# Patient Record
Sex: FEMALE | Race: WHITE | Employment: PART TIME | URBAN - NONMETROPOLITAN AREA
[De-identification: names, ages, dates, MRNs, and addresses within clinical notes are randomized per-mention and may not be internally consistent; named-entity substitution may affect disease eponyms.]

---
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
## 2021-03-03 ENCOUNTER — HOSPITAL ENCOUNTER (OUTPATIENT)
Age: 21
Discharge: HOME | End: 2021-03-03

## 2021-09-30 ENCOUNTER — OFFICE VISIT (OUTPATIENT)
Dept: FAMILY MEDICINE CLINIC | Age: 21
End: 2021-09-30
Payer: COMMERCIAL

## 2021-09-30 VITALS
WEIGHT: 130.8 LBS | OXYGEN SATURATION: 98 % | SYSTOLIC BLOOD PRESSURE: 118 MMHG | DIASTOLIC BLOOD PRESSURE: 70 MMHG | HEIGHT: 64 IN | RESPIRATION RATE: 12 BRPM | HEART RATE: 92 BPM | BODY MASS INDEX: 22.33 KG/M2 | TEMPERATURE: 97 F

## 2021-09-30 DIAGNOSIS — Z23 NEED FOR VARICELLA VACCINE: ICD-10-CM

## 2021-09-30 DIAGNOSIS — F41.9 ANXIETY: ICD-10-CM

## 2021-09-30 DIAGNOSIS — Z11.59 ENCOUNTER FOR HEPATITIS C SCREENING TEST FOR LOW RISK PATIENT: ICD-10-CM

## 2021-09-30 DIAGNOSIS — F32.A DEPRESSION, UNSPECIFIED DEPRESSION TYPE: ICD-10-CM

## 2021-09-30 DIAGNOSIS — R07.9 CHEST PAIN, UNSPECIFIED TYPE: Primary | ICD-10-CM

## 2021-09-30 DIAGNOSIS — Z11.4 ENCOUNTER FOR HIV (HUMAN IMMUNODEFICIENCY VIRUS) TEST: ICD-10-CM

## 2021-09-30 PROCEDURE — 93000 ELECTROCARDIOGRAM COMPLETE: CPT | Performed by: STUDENT IN AN ORGANIZED HEALTH CARE EDUCATION/TRAINING PROGRAM

## 2021-09-30 PROCEDURE — 99204 OFFICE O/P NEW MOD 45 MIN: CPT | Performed by: STUDENT IN AN ORGANIZED HEALTH CARE EDUCATION/TRAINING PROGRAM

## 2021-09-30 SDOH — ECONOMIC STABILITY: FOOD INSECURITY: WITHIN THE PAST 12 MONTHS, YOU WORRIED THAT YOUR FOOD WOULD RUN OUT BEFORE YOU GOT MONEY TO BUY MORE.: NEVER TRUE

## 2021-09-30 SDOH — ECONOMIC STABILITY: FOOD INSECURITY: WITHIN THE PAST 12 MONTHS, THE FOOD YOU BOUGHT JUST DIDN'T LAST AND YOU DIDN'T HAVE MONEY TO GET MORE.: NEVER TRUE

## 2021-09-30 ASSESSMENT — PATIENT HEALTH QUESTIONNAIRE - PHQ9
SUM OF ALL RESPONSES TO PHQ9 QUESTIONS 1 & 2: 1
SUM OF ALL RESPONSES TO PHQ QUESTIONS 1-9: 1
SUM OF ALL RESPONSES TO PHQ QUESTIONS 1-9: 1
1. LITTLE INTEREST OR PLEASURE IN DOING THINGS: 0
SUM OF ALL RESPONSES TO PHQ QUESTIONS 1-9: 1
2. FEELING DOWN, DEPRESSED OR HOPELESS: 1

## 2021-09-30 ASSESSMENT — SOCIAL DETERMINANTS OF HEALTH (SDOH): HOW HARD IS IT FOR YOU TO PAY FOR THE VERY BASICS LIKE FOOD, HOUSING, MEDICAL CARE, AND HEATING?: NOT HARD AT ALL

## 2021-09-30 ASSESSMENT — ENCOUNTER SYMPTOMS
DIARRHEA: 0
SHORTNESS OF BREATH: 1
RHINORRHEA: 0
CONSTIPATION: 0
WHEEZING: 0
ABDOMINAL PAIN: 0

## 2021-09-30 NOTE — PATIENT INSTRUCTIONS
Thank you   1. Thank you for trusting us with your healthcare needs. You may receive a survey regarding today's visit. It would help us out if you would take a few moments to provide your feedback. We value your input. 2. Please bring in ALL medications BOTTLES, including inhalers, herbal supplements, over the counter, prescribed & non-prescribed medicine. The office would like actual medication bottles and a list.   3. Please note our OFFICE POLICIES:   a. Prior to getting your labs drawn, please check with your insurance company for benefits and eligibility of lab services. Often, insurance companies cover certain tests for preventative visits only. It is patient's responsibility to see what is covered. b. We are unable to change a diagnosis after the test has been performed. c. Lab orders will not be re-printed. Please hold onto your original lab orders and take them to your lab to be completed. d. If you no show your scheduled appointment three times, you will be dismissed from this practice. e. Helen Button must be completed 24 hours prior to your schedule appointment. 4. If the list below has been completed, PLEASE FAX RECORDS TO OUR OFFICE @ [phone].  Once the records have been received we will update your records at our office:  Health Maintenance Due   Topic Date Due    Hepatitis C screen  Never done    Varicella vaccine (1 of 2 - 2-dose childhood series) Never done    HPV vaccine (1 - 2-dose series) Never done    HIV screen  Never done    Chlamydia screen  Never done    DTaP/Tdap/Td vaccine (1 - Tdap) Never done    Flu vaccine (1) Never done

## 2021-09-30 NOTE — PROGRESS NOTES
S: 22 y.o. female with   Chief Complaint   Patient presents with    Established New Doctor     chest pains last time was 3 weeks ago lasted 1-3 hours       HPI: please see resident note for HPI and ROS. BP Readings from Last 3 Encounters:   09/30/21 118/70   12/18/19 (!) 109/56   10/09/19 (!) 106/56     Wt Readings from Last 3 Encounters:   09/30/21 130 lb 12.8 oz (59.3 kg)   12/18/19 128 lb (58.1 kg) (55 %, Z= 0.13)*   10/09/19 127 lb (57.6 kg) (54 %, Z= 0.10)*     * Growth percentiles are based on CDC (Girls, 2-20 Years) data. O: VS:  height is 5' 4\" (1.626 m) and weight is 130 lb 12.8 oz (59.3 kg). Her temporal temperature is 97 °F (36.1 °C). Her blood pressure is 118/70 and her pulse is 92. Her respiration is 12 and oxygen saturation is 98%. AAO/NAD, appropriate affect for mood       Diagnosis Orders   1. Chest pain, unspecified type  EKG 12 Lead   2. Anxiety     3. Depression, unspecified depression type     4. Encounter for HIV (human immunodeficiency virus) test     5. Encounter for hepatitis C screening test for low risk patient         Plan:  EKG and labs as ordered. Start zoloft 50mg daily. Health Maintenance Due   Topic Date Due    Hepatitis C screen  Never done    Varicella vaccine (1 of 2 - 2-dose childhood series) Never done    HPV vaccine (1 - 2-dose series) Never done    HIV screen  Never done    Chlamydia screen  Never done    DTaP/Tdap/Td vaccine (1 - Tdap) Never done    Flu vaccine (1) Never done       Attending Physician Statement  I have discussed the case, including pertinent history and exam findings with the resident. I also have seen the patient and performed key portions of the examination. I agree with the documented assessment and plan as documented by the resident.         Vini Deleon DO 9/30/2021 10:31 AM

## 2021-09-30 NOTE — PROGRESS NOTES
Luis Hebert is a 22 y.o. female who presents today for:  Chief Complaint   Patient presents with   Katerine Medina Doctor     chest pains last time was 3 weeks ago lasted 1-3 hours       HPI:   Was having daily bouts of chest pain, started April/may 2021  Got worse over summer  Pressure in the L chest and into her neck, sometimes in her back   Peak pain 10/10  SOB with it  Lasts about an hour, up to 3   Nothing seems to bring the pain on  Endorses hx of anxiety with stress and panic attacks that feel different than this   Last episode 3 weeks ago   fam hx- uncle with heart attack at 50   No pain with working out- weight lifting, tennis, running.  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     No medical problems, only on birth control from [place]    Non smoker  No alcohol  No illicit drug use        Food Insecurity: No Food Insecurity    Worried About Running Out of Food in the Last Year: Never true    Ran Out of Food in the Last Year: Never true     Health Maintenance reviewed -   Health Maintenance   Topic Date Due    Hepatitis C screen  Never done    Varicella vaccine (1 of 2 - 2-dose childhood series) Never done    HPV vaccine (1 - 2-dose series) Never done    HIV screen  Never done    Chlamydia screen  Never done    DTaP/Tdap/Td vaccine (1 - Tdap) Never done    Flu vaccine (1) Never done    COVID-19 Vaccine  Completed    Hepatitis A vaccine  Aged Out    Hepatitis B vaccine  Aged Out    Hib vaccine  Aged Out    Meningococcal (ACWY) vaccine  Aged Out    Pneumococcal 0-64 years Vaccine  Aged Out     Current Outpatient Medications   Medication Sig Dispense Refill    sertraline (ZOLOFT) 50 MG tablet Take 1 tablet by mouth daily 45 tablet 0    levonorgestrel-ethinyl estradiol (KURVELO) 0.15-30 MG-MCG per tablet Take 1 tablet by mouth daily       No current facility-administered medications for this visit. Social History     Tobacco Use    Smoking status: Never Smoker    Smokeless tobacco: Never Used   Substance Use Topics    Alcohol use: No      Subjective:   Review of Systems   Constitutional: Negative for fatigue and fever. HENT: Negative for congestion and rhinorrhea. Respiratory: Positive for shortness of breath. Negative for wheezing. Cardiovascular: Positive for chest pain and palpitations. Gastrointestinal: Negative for abdominal pain, constipation and diarrhea. Genitourinary: Negative for dysuria, vaginal discharge and vaginal pain. Objective:     Vitals:    09/30/21 0955   BP: 118/70   Site: Left Upper Arm   Position: Sitting   Cuff Size: Medium Adult   Pulse: 92   Resp: 12   Temp: 97 °F (36.1 °C)   TempSrc: Temporal   SpO2: 98%   Weight: 130 lb 12.8 oz (59.3 kg)   Height: 5' 4\" (1.626 m)     Body mass index is 22.45 kg/m². Wt Readings from Last 3 Encounters:   09/30/21 130 lb 12.8 oz (59.3 kg)   12/18/19 128 lb (58.1 kg) (55 %, Z= 0.13)*   10/09/19 127 lb (57.6 kg) (54 %, Z= 0.10)*     * Growth percentiles are based on CDC (Girls, 2-20 Years) data. BP Readings from Last 3 Encounters:   09/30/21 118/70   12/18/19 (!) 109/56   10/09/19 (!) 106/56     Physical Exam  Vitals and nursing note reviewed. Constitutional:       General: She is not in acute distress. Appearance: She is well-developed. She is not diaphoretic. HENT:      Head: Normocephalic and atraumatic. Right Ear: Tympanic membrane normal.      Left Ear: Tympanic membrane normal.      Mouth/Throat:      Mouth: Mucous membranes are moist.      Pharynx: No oropharyngeal exudate or posterior oropharyngeal erythema. Eyes:      General: No scleral icterus.      Extraocular Movements: Extraocular movements intact. Pupils: Pupils are equal, round, and reactive to light. Cardiovascular:      Rate and Rhythm: Normal rate and regular rhythm. Heart sounds: Normal heart sounds. No murmur heard. Pulmonary:      Effort: Pulmonary effort is normal.      Breath sounds: Normal breath sounds. No wheezing. Abdominal:      General: There is no distension. Palpations: Abdomen is soft. Tenderness: There is no abdominal tenderness. Neurological:      General: No focal deficit present. Mental Status: She is alert. Cranial Nerves: No cranial nerve deficit. Psychiatric:         Thought Content: Thought content normal.         Judgment: Judgment normal.       EKG performed in office today was within normal limits    Assessment / Plan:   Nacho Salas was seen today for established new doctor. Diagnoses and all orders for this visit:    Chest pain, unspecified type  -     EKG 12 Lead  -     Comprehensive Metabolic Panel; Future  -     CBC With Auto Differential; Future  -     TSH; Future  -     T4, Free; Future    Anxiety  -     sertraline (ZOLOFT) 50 MG tablet; Take 1 tablet by mouth daily    Depression, unspecified depression type  -     sertraline (ZOLOFT) 50 MG tablet; Take 1 tablet by mouth daily  -     Comprehensive Metabolic Panel; Future  -     TSH; Future  -     T4, Free; Future    Encounter for HIV (human immunodeficiency virus) test  -     HIV Screen; Future    Encounter for hepatitis C screening test for low risk patient  -     Hepatitis C Antibody; Future    Need for varicella vaccine  -     Varicella Zoster Antibody, IgG; Future      Patient presents to establish care and discuss chest pains   Intermittent chest pains-unclear etiology at this time, likely related to anxiety. Consider esophagus/blood vessel spasms. EKG in office today was normal.  Will check labs. We will start Zoloft for anxiety, see below   Anxiety/depression-chronic, poorly controlled.   Patient did not tolerate Lexapro in the past.  We will start Zoloft 50 mg daily today   We will check a CMP, CBC, TSH, free T4 along with health maintenance labs of HIV screen, hep C screen, check for zoster immunity. Patient to follow-up in 1 month, earlier as needed       Return in about 4 weeks (around 10/28/2021). Medications Prescribed:  Orders Placed This Encounter   Medications    sertraline (ZOLOFT) 50 MG tablet     Sig: Take 1 tablet by mouth daily     Dispense:  45 tablet     Refill:  0       Future Appointments   Date Time Provider Tessy Mosquera   11/2/2021  3:40 PM [facility]       Patient given educational materials - see patient instructions. Discussed use, benefit, and side effects of prescribed medications. All patient questions answered. Pt voiced understanding. Instructed to continue current medications, diet and exercise. Patient agreed with treatment plan. Follow up as directed. Part of this visit was documented via [id number] technology, please excuse any errors.      Electronically signed by Ann Perea MD on 9/30/2021 at 10:42 AM

## 2021-09-30 NOTE — PROGRESS NOTES
Health Maintenance Due   Topic Date Due    Hepatitis C screen  Never done    Varicella vaccine (1 of 2 - 2-dose childhood series) Never done    HPV vaccine (1 - 2-dose series) Never done    HIV screen  Never done    Chlamydia screen  Never done    DTaP/Tdap/Td vaccine (1 - Tdap) Never done    Flu vaccine (1) Never done

## 2021-11-22 ENCOUNTER — ANESTHESIA EVENT (OUTPATIENT)
Dept: OPERATING ROOM | Age: 21
End: 2021-11-22
Payer: COMMERCIAL

## 2021-11-22 ENCOUNTER — HOSPITAL ENCOUNTER (OUTPATIENT)
Age: 21
Setting detail: OBSERVATION
Discharge: HOME | End: 2021-11-23
Attending: EMERGENCY MEDICINE | Admitting: SURGERY
Payer: COMMERCIAL

## 2021-11-22 ENCOUNTER — APPOINTMENT (OUTPATIENT)
Dept: CT IMAGING | Age: 21
End: 2021-11-22
Payer: COMMERCIAL

## 2021-11-22 ENCOUNTER — ANESTHESIA (OUTPATIENT)
Dept: OPERATING ROOM | Age: 21
End: 2021-11-22
Payer: COMMERCIAL

## 2021-11-22 VITALS — SYSTOLIC BLOOD PRESSURE: 143 MMHG | DIASTOLIC BLOOD PRESSURE: 68 MMHG | OXYGEN SATURATION: 94 %

## 2021-11-22 DIAGNOSIS — K35.80 ACUTE APPENDICITIS, UNSPECIFIED ACUTE APPENDICITIS TYPE: ICD-10-CM

## 2021-11-22 DIAGNOSIS — K35.20 ACUTE APPENDICITIS WITH GENERALIZED PERITONITIS, WITHOUT ABSCESS, UNSPECIFIED WHETHER GANGRENE PRESENT, UNSPECIFIED WHETHER PERFORATION PRESENT: Primary | ICD-10-CM

## 2021-11-22 LAB
ALBUMIN SERPL-MCNC: 4.6 G/DL (ref 3.5–5.1)
ALP BLD-CCNC: 82 U/L (ref 38–126)
ALT SERPL-CCNC: 10 U/L (ref 11–66)
AMPHETAMINE+METHAMPHETAMINE URINE SCREEN: NEGATIVE
ANION GAP SERPL CALCULATED.3IONS-SCNC: 14 MEQ/L (ref 8–16)
AST SERPL-CCNC: 20 U/L (ref 5–40)
BACTERIA: ABNORMAL /HPF
BARBITURATE QUANTITATIVE URINE: NEGATIVE
BASOPHILS # BLD: 0.6 %
BASOPHILS ABSOLUTE: 0.1 THOU/MM3 (ref 0–0.1)
BENZODIAZEPINE QUANTITATIVE URINE: NEGATIVE
BILIRUB SERPL-MCNC: 0.3 MG/DL (ref 0.3–1.2)
BILIRUBIN DIRECT: < 0.2 MG/DL (ref 0–0.3)
BILIRUBIN URINE: NEGATIVE
BLOOD, URINE: NEGATIVE
BUN BLDV-MCNC: 8 MG/DL (ref 7–22)
CALCIUM SERPL-MCNC: 9.4 MG/DL (ref 8.5–10.5)
CANNABINOID QUANTITATIVE URINE: NEGATIVE
CASTS 2: ABNORMAL /LPF
CASTS UA: ABNORMAL /LPF
CHARACTER, URINE: CLEAR
CHLORIDE BLD-SCNC: 103 MEQ/L (ref 98–111)
CO2: 23 MEQ/L (ref 23–33)
COCAINE METABOLITE QUANTITATIVE URINE: NEGATIVE
COLOR: YELLOW
CREAT SERPL-MCNC: 0.8 MG/DL (ref 0.4–1.2)
CRYSTALS, UA: ABNORMAL
EOSINOPHIL # BLD: 2.4 %
EOSINOPHILS ABSOLUTE: 0.2 THOU/MM3 (ref 0–0.4)
EPITHELIAL CELLS, UA: ABNORMAL /HPF
ERYTHROCYTE [DISTWIDTH] IN BLOOD BY AUTOMATED COUNT: 12.2 % (ref 11.5–14.5)
ERYTHROCYTE [DISTWIDTH] IN BLOOD BY AUTOMATED COUNT: 40.8 FL (ref 35–45)
GFR SERPL CREATININE-BSD FRML MDRD: > 90 ML/MIN/1.73M2
GLUCOSE BLD-MCNC: 91 MG/DL (ref 70–108)
GLUCOSE URINE: NEGATIVE MG/DL
HCT VFR BLD CALC: 46.1 % (ref 37–47)
HEMOGLOBIN: 15 GM/DL (ref 12–16)
IMMATURE GRANS (ABS): 0.04 THOU/MM3 (ref 0–0.07)
IMMATURE GRANULOCYTES: 0.4 %
KETONES, URINE: NEGATIVE
LEUKOCYTE ESTERASE, URINE: ABNORMAL
LIPASE: 21.6 U/L (ref 5.6–51.3)
LYMPHOCYTES # BLD: 28.5 %
LYMPHOCYTES ABSOLUTE: 2.7 THOU/MM3 (ref 1–4.8)
MAGNESIUM: 1.8 MG/DL (ref 1.6–2.4)
MCH RBC QN AUTO: 29.2 PG (ref 26–33)
MCHC RBC AUTO-ENTMCNC: 32.5 GM/DL (ref 32.2–35.5)
MCV RBC AUTO: 89.7 FL (ref 81–99)
MISCELLANEOUS 2: ABNORMAL
MONOCYTES # BLD: 5.8 %
MONOCYTES ABSOLUTE: 0.6 THOU/MM3 (ref 0.4–1.3)
MUCUS: ABNORMAL
NITRITE, URINE: NEGATIVE
NUCLEATED RED BLOOD CELLS: 0 /100 WBC
OPIATES, URINE: NEGATIVE
OSMOLALITY CALCULATION: 277.3 MOSMOL/KG (ref 275–300)
OXYCODONE: NEGATIVE
PH UA: 5 (ref 5–9)
PHENCYCLIDINE QUANTITATIVE URINE: NEGATIVE
PLATELET # BLD: 381 THOU/MM3 (ref 130–400)
PMV BLD AUTO: 9 FL (ref 9.4–12.4)
POTASSIUM SERPL-SCNC: 3.7 MEQ/L (ref 3.5–5.2)
PREGNANCY, SERUM: NEGATIVE
PROTEIN UA: NEGATIVE
RBC # BLD: 5.14 MILL/MM3 (ref 4.2–5.4)
RBC URINE: ABNORMAL /HPF
RENAL EPITHELIAL, UA: ABNORMAL
SEG NEUTROPHILS: 62.3 %
SEGMENTED NEUTROPHILS ABSOLUTE COUNT: 6 THOU/MM3 (ref 1.8–7.7)
SODIUM BLD-SCNC: 140 MEQ/L (ref 135–145)
SPECIFIC GRAVITY, URINE: 1.02 (ref 1–1.03)
TOTAL PROTEIN: 8.3 G/DL (ref 6.1–8)
UROBILINOGEN, URINE: 0.2 EU/DL (ref 0–1)
WBC # BLD: 9.6 THOU/MM3 (ref 4.8–10.8)
WBC UA: ABNORMAL /HPF
YEAST: ABNORMAL

## 2021-11-22 PROCEDURE — 44970 LAPAROSCOPY APPENDECTOMY: CPT | Performed by: SURGERY

## 2021-11-22 PROCEDURE — 6360000002 HC RX W HCPCS: Performed by: NURSE ANESTHETIST, CERTIFIED REGISTERED

## 2021-11-22 PROCEDURE — 2580000003 HC RX 258: Performed by: NURSE ANESTHETIST, CERTIFIED REGISTERED

## 2021-11-22 PROCEDURE — 2580000003 HC RX 258: Performed by: SURGERY

## 2021-11-22 PROCEDURE — 36415 COLL VENOUS BLD VENIPUNCTURE: CPT

## 2021-11-22 PROCEDURE — 96376 TX/PRO/DX INJ SAME DRUG ADON: CPT

## 2021-11-22 PROCEDURE — 6360000002 HC RX W HCPCS: Performed by: SURGERY

## 2021-11-22 PROCEDURE — 80053 COMPREHEN METABOLIC PANEL: CPT

## 2021-11-22 PROCEDURE — 88304 TISSUE EXAM BY PATHOLOGIST: CPT

## 2021-11-22 PROCEDURE — 83735 ASSAY OF MAGNESIUM: CPT

## 2021-11-22 PROCEDURE — C9113 INJ PANTOPRAZOLE SODIUM, VIA: HCPCS | Performed by: EMERGENCY MEDICINE

## 2021-11-22 PROCEDURE — 3600000004 HC SURGERY LEVEL 4 BASE: Performed by: SURGERY

## 2021-11-22 PROCEDURE — G0378 HOSPITAL OBSERVATION PER HR: HCPCS

## 2021-11-22 PROCEDURE — 6370000000 HC RX 637 (ALT 250 FOR IP): Performed by: SURGERY

## 2021-11-22 PROCEDURE — 7100000001 HC PACU RECOVERY - ADDTL 15 MIN: Performed by: SURGERY

## 2021-11-22 PROCEDURE — 3600000014 HC SURGERY LEVEL 4 ADDTL 15MIN: Performed by: SURGERY

## 2021-11-22 PROCEDURE — 6360000002 HC RX W HCPCS: Performed by: EMERGENCY MEDICINE

## 2021-11-22 PROCEDURE — 3700000001 HC ADD 15 MINUTES (ANESTHESIA): Performed by: SURGERY

## 2021-11-22 PROCEDURE — 96374 THER/PROPH/DIAG INJ IV PUSH: CPT

## 2021-11-22 PROCEDURE — 3700000000 HC ANESTHESIA ATTENDED CARE: Performed by: SURGERY

## 2021-11-22 PROCEDURE — 81001 URINALYSIS AUTO W/SCOPE: CPT

## 2021-11-22 PROCEDURE — 74177 CT ABD & PELVIS W/CONTRAST: CPT

## 2021-11-22 PROCEDURE — 2709999900 HC NON-CHARGEABLE SUPPLY: Performed by: SURGERY

## 2021-11-22 PROCEDURE — 84703 CHORIONIC GONADOTROPIN ASSAY: CPT

## 2021-11-22 PROCEDURE — 2720000010 HC SURG SUPPLY STERILE: Performed by: SURGERY

## 2021-11-22 PROCEDURE — 7100000000 HC PACU RECOVERY - FIRST 15 MIN: Performed by: SURGERY

## 2021-11-22 PROCEDURE — 99283 EMERGENCY DEPT VISIT LOW MDM: CPT

## 2021-11-22 PROCEDURE — 2500000003 HC RX 250 WO HCPCS: Performed by: NURSE ANESTHETIST, CERTIFIED REGISTERED

## 2021-11-22 PROCEDURE — 2500000003 HC RX 250 WO HCPCS: Performed by: SURGERY

## 2021-11-22 PROCEDURE — 82248 BILIRUBIN DIRECT: CPT

## 2021-11-22 PROCEDURE — 99220 PR INITIAL OBSERVATION CARE/DAY 70 MINUTES: CPT | Performed by: SURGERY

## 2021-11-22 PROCEDURE — 83690 ASSAY OF LIPASE: CPT

## 2021-11-22 PROCEDURE — 85025 COMPLETE CBC W/AUTO DIFF WBC: CPT

## 2021-11-22 PROCEDURE — 6360000004 HC RX CONTRAST MEDICATION: Performed by: EMERGENCY MEDICINE

## 2021-11-22 PROCEDURE — 80307 DRUG TEST PRSMV CHEM ANLYZR: CPT

## 2021-11-22 PROCEDURE — 96375 TX/PRO/DX INJ NEW DRUG ADDON: CPT

## 2021-11-22 PROCEDURE — 2580000003 HC RX 258: Performed by: EMERGENCY MEDICINE

## 2021-11-22 RX ORDER — SODIUM CHLORIDE 0.9 % (FLUSH) 0.9 %
5-40 SYRINGE (ML) INJECTION PRN
Status: DISCONTINUED | OUTPATIENT
Start: 2021-11-22 | End: 2021-11-22 | Stop reason: HOSPADM

## 2021-11-22 RX ORDER — ROCURONIUM BROMIDE 10 MG/ML
INJECTION, SOLUTION INTRAVENOUS PRN
Status: DISCONTINUED | OUTPATIENT
Start: 2021-11-22 | End: 2021-11-22 | Stop reason: SDUPTHER

## 2021-11-22 RX ORDER — LABETALOL 20 MG/4 ML (5 MG/ML) INTRAVENOUS SYRINGE
5 EVERY 10 MIN PRN
Status: DISCONTINUED | OUTPATIENT
Start: 2021-11-22 | End: 2021-11-22 | Stop reason: HOSPADM

## 2021-11-22 RX ORDER — PANTOPRAZOLE SODIUM 40 MG/10ML
40 INJECTION, POWDER, LYOPHILIZED, FOR SOLUTION INTRAVENOUS ONCE
Status: COMPLETED | OUTPATIENT
Start: 2021-11-22 | End: 2021-11-22

## 2021-11-22 RX ORDER — SODIUM CHLORIDE 9 MG/ML
INJECTION, SOLUTION INTRAVENOUS CONTINUOUS
Status: DISCONTINUED | OUTPATIENT
Start: 2021-11-22 | End: 2021-11-23 | Stop reason: HOSPADM

## 2021-11-22 RX ORDER — ONDANSETRON 2 MG/ML
4 INJECTION INTRAMUSCULAR; INTRAVENOUS EVERY 6 HOURS PRN
Status: DISCONTINUED | OUTPATIENT
Start: 2021-11-22 | End: 2021-11-23 | Stop reason: HOSPADM

## 2021-11-22 RX ORDER — ONDANSETRON 2 MG/ML
INJECTION INTRAMUSCULAR; INTRAVENOUS PRN
Status: DISCONTINUED | OUTPATIENT
Start: 2021-11-22 | End: 2021-11-22 | Stop reason: SDUPTHER

## 2021-11-22 RX ORDER — FENTANYL CITRATE 50 UG/ML
25 INJECTION, SOLUTION INTRAMUSCULAR; INTRAVENOUS EVERY 5 MIN PRN
Status: DISCONTINUED | OUTPATIENT
Start: 2021-11-22 | End: 2021-11-22 | Stop reason: HOSPADM

## 2021-11-22 RX ORDER — 0.9 % SODIUM CHLORIDE 0.9 %
1000 INTRAVENOUS SOLUTION INTRAVENOUS ONCE
Status: COMPLETED | OUTPATIENT
Start: 2021-11-22 | End: 2021-11-22

## 2021-11-22 RX ORDER — SODIUM CHLORIDE 9 MG/ML
25 INJECTION, SOLUTION INTRAVENOUS PRN
Status: DISCONTINUED | OUTPATIENT
Start: 2021-11-22 | End: 2021-11-22 | Stop reason: HOSPADM

## 2021-11-22 RX ORDER — LIDOCAINE HCL/PF 100 MG/5ML
SYRINGE (ML) INJECTION PRN
Status: DISCONTINUED | OUTPATIENT
Start: 2021-11-22 | End: 2021-11-22 | Stop reason: SDUPTHER

## 2021-11-22 RX ORDER — PROPOFOL 10 MG/ML
INJECTION, EMULSION INTRAVENOUS PRN
Status: DISCONTINUED | OUTPATIENT
Start: 2021-11-22 | End: 2021-11-22 | Stop reason: SDUPTHER

## 2021-11-22 RX ORDER — OXYCODONE HYDROCHLORIDE 5 MG/1
10 TABLET ORAL EVERY 4 HOURS PRN
Status: DISCONTINUED | OUTPATIENT
Start: 2021-11-22 | End: 2021-11-23 | Stop reason: HOSPADM

## 2021-11-22 RX ORDER — SODIUM CHLORIDE 9 MG/ML
25 INJECTION, SOLUTION INTRAVENOUS PRN
Status: DISCONTINUED | OUTPATIENT
Start: 2021-11-22 | End: 2021-11-23 | Stop reason: HOSPADM

## 2021-11-22 RX ORDER — MORPHINE SULFATE 2 MG/ML
2 INJECTION, SOLUTION INTRAMUSCULAR; INTRAVENOUS
Status: DISCONTINUED | OUTPATIENT
Start: 2021-11-22 | End: 2021-11-23 | Stop reason: HOSPADM

## 2021-11-22 RX ORDER — ONDANSETRON 4 MG/1
4 TABLET, ORALLY DISINTEGRATING ORAL EVERY 8 HOURS PRN
Status: DISCONTINUED | OUTPATIENT
Start: 2021-11-22 | End: 2021-11-23 | Stop reason: HOSPADM

## 2021-11-22 RX ORDER — MEPERIDINE HYDROCHLORIDE 25 MG/ML
12.5 INJECTION INTRAMUSCULAR; INTRAVENOUS; SUBCUTANEOUS EVERY 5 MIN PRN
Status: DISCONTINUED | OUTPATIENT
Start: 2021-11-22 | End: 2021-11-22 | Stop reason: HOSPADM

## 2021-11-22 RX ORDER — SODIUM CHLORIDE 0.9 % (FLUSH) 0.9 %
5-40 SYRINGE (ML) INJECTION EVERY 12 HOURS SCHEDULED
Status: DISCONTINUED | OUTPATIENT
Start: 2021-11-22 | End: 2021-11-22 | Stop reason: HOSPADM

## 2021-11-22 RX ORDER — MORPHINE SULFATE 2 MG/ML
2 INJECTION, SOLUTION INTRAMUSCULAR; INTRAVENOUS EVERY 4 HOURS PRN
Status: DISCONTINUED | OUTPATIENT
Start: 2021-11-22 | End: 2021-11-23 | Stop reason: HOSPADM

## 2021-11-22 RX ORDER — ONDANSETRON 2 MG/ML
4 INJECTION INTRAMUSCULAR; INTRAVENOUS ONCE
Status: COMPLETED | OUTPATIENT
Start: 2021-11-22 | End: 2021-11-22

## 2021-11-22 RX ORDER — SODIUM CHLORIDE 0.9 % (FLUSH) 0.9 %
5-40 SYRINGE (ML) INJECTION EVERY 12 HOURS SCHEDULED
Status: DISCONTINUED | OUTPATIENT
Start: 2021-11-22 | End: 2021-11-23 | Stop reason: HOSPADM

## 2021-11-22 RX ORDER — SODIUM CHLORIDE 9 MG/ML
10 INJECTION INTRAVENOUS ONCE
Status: COMPLETED | OUTPATIENT
Start: 2021-11-22 | End: 2021-11-22

## 2021-11-22 RX ORDER — SODIUM CHLORIDE 0.9 % (FLUSH) 0.9 %
5-40 SYRINGE (ML) INJECTION PRN
Status: DISCONTINUED | OUTPATIENT
Start: 2021-11-22 | End: 2021-11-23 | Stop reason: HOSPADM

## 2021-11-22 RX ORDER — FENTANYL CITRATE 50 UG/ML
50 INJECTION, SOLUTION INTRAMUSCULAR; INTRAVENOUS EVERY 5 MIN PRN
Status: DISCONTINUED | OUTPATIENT
Start: 2021-11-22 | End: 2021-11-22 | Stop reason: HOSPADM

## 2021-11-22 RX ORDER — SUCCINYLCHOLINE/SOD CL,ISO/PF 200MG/10ML
SYRINGE (ML) INTRAVENOUS PRN
Status: DISCONTINUED | OUTPATIENT
Start: 2021-11-22 | End: 2021-11-22 | Stop reason: SDUPTHER

## 2021-11-22 RX ORDER — PROMETHAZINE HYDROCHLORIDE 25 MG/ML
12.5 INJECTION, SOLUTION INTRAMUSCULAR; INTRAVENOUS
Status: DISCONTINUED | OUTPATIENT
Start: 2021-11-22 | End: 2021-11-22 | Stop reason: HOSPADM

## 2021-11-22 RX ORDER — LIDOCAINE HYDROCHLORIDE 10 MG/ML
INJECTION, SOLUTION EPIDURAL; INFILTRATION; INTRACAUDAL; PERINEURAL PRN
Status: DISCONTINUED | OUTPATIENT
Start: 2021-11-22 | End: 2021-11-22 | Stop reason: ALTCHOICE

## 2021-11-22 RX ORDER — GLYCOPYRROLATE 1 MG/5 ML
SYRINGE (ML) INTRAVENOUS PRN
Status: DISCONTINUED | OUTPATIENT
Start: 2021-11-22 | End: 2021-11-22 | Stop reason: SDUPTHER

## 2021-11-22 RX ORDER — KETOROLAC TROMETHAMINE 30 MG/ML
30 INJECTION, SOLUTION INTRAMUSCULAR; INTRAVENOUS ONCE
Status: COMPLETED | OUTPATIENT
Start: 2021-11-22 | End: 2021-11-22

## 2021-11-22 RX ORDER — OXYCODONE HYDROCHLORIDE 5 MG/1
5 TABLET ORAL EVERY 4 HOURS PRN
Status: DISCONTINUED | OUTPATIENT
Start: 2021-11-22 | End: 2021-11-23 | Stop reason: HOSPADM

## 2021-11-22 RX ORDER — NEOSTIGMINE METHYLSULFATE 5 MG/5 ML
SYRINGE (ML) INTRAVENOUS PRN
Status: DISCONTINUED | OUTPATIENT
Start: 2021-11-22 | End: 2021-11-22 | Stop reason: SDUPTHER

## 2021-11-22 RX ORDER — MIDAZOLAM HYDROCHLORIDE 1 MG/ML
INJECTION INTRAMUSCULAR; INTRAVENOUS PRN
Status: DISCONTINUED | OUTPATIENT
Start: 2021-11-22 | End: 2021-11-22 | Stop reason: SDUPTHER

## 2021-11-22 RX ORDER — DEXAMETHASONE SODIUM PHOSPHATE 10 MG/ML
INJECTION, EMULSION INTRAMUSCULAR; INTRAVENOUS PRN
Status: DISCONTINUED | OUTPATIENT
Start: 2021-11-22 | End: 2021-11-22 | Stop reason: SDUPTHER

## 2021-11-22 RX ORDER — FENTANYL CITRATE 50 UG/ML
INJECTION, SOLUTION INTRAMUSCULAR; INTRAVENOUS PRN
Status: DISCONTINUED | OUTPATIENT
Start: 2021-11-22 | End: 2021-11-22 | Stop reason: SDUPTHER

## 2021-11-22 RX ADMIN — ONDANSETRON HYDROCHLORIDE 4 MG: 4 INJECTION, SOLUTION INTRAMUSCULAR; INTRAVENOUS at 11:05

## 2021-11-22 RX ADMIN — CEFAZOLIN 2000 MG: 1 INJECTION, POWDER, FOR SOLUTION INTRAMUSCULAR; INTRAVENOUS at 11:19

## 2021-11-22 RX ADMIN — OXYCODONE HYDROCHLORIDE 10 MG: 5 TABLET ORAL at 18:09

## 2021-11-22 RX ADMIN — SODIUM CHLORIDE 10 ML: 9 INJECTION, SOLUTION INTRAMUSCULAR; INTRAVENOUS; SUBCUTANEOUS at 06:00

## 2021-11-22 RX ADMIN — Medication 80 MG: at 11:01

## 2021-11-22 RX ADMIN — SODIUM CHLORIDE: 9 INJECTION, SOLUTION INTRAVENOUS at 19:39

## 2021-11-22 RX ADMIN — ROCURONIUM BROMIDE 40 MG: 10 INJECTION INTRAVENOUS at 11:05

## 2021-11-22 RX ADMIN — DEXAMETHASONE SODIUM PHOSPHATE 10 MG: 10 INJECTION, EMULSION INTRAMUSCULAR; INTRAVENOUS at 11:05

## 2021-11-22 RX ADMIN — PANTOPRAZOLE SODIUM 40 MG: 40 INJECTION, POWDER, FOR SOLUTION INTRAVENOUS at 05:59

## 2021-11-22 RX ADMIN — MORPHINE SULFATE 2 MG: 2 INJECTION, SOLUTION INTRAMUSCULAR; INTRAVENOUS at 19:40

## 2021-11-22 RX ADMIN — ONDANSETRON 4 MG: 2 INJECTION INTRAMUSCULAR; INTRAVENOUS at 06:00

## 2021-11-22 RX ADMIN — PROPOFOL 150 MG: 10 INJECTION, EMULSION INTRAVENOUS at 11:01

## 2021-11-22 RX ADMIN — MORPHINE SULFATE 2 MG: 2 INJECTION, SOLUTION INTRAMUSCULAR; INTRAVENOUS at 22:42

## 2021-11-22 RX ADMIN — KETOROLAC TROMETHAMINE 30 MG: 30 INJECTION, SOLUTION INTRAMUSCULAR; INTRAVENOUS at 07:23

## 2021-11-22 RX ADMIN — IOPAMIDOL 80 ML: 755 INJECTION, SOLUTION INTRAVENOUS at 06:46

## 2021-11-22 RX ADMIN — SODIUM CHLORIDE: 9 INJECTION, SOLUTION INTRAVENOUS at 09:58

## 2021-11-22 RX ADMIN — Medication 0.6 MG: at 11:39

## 2021-11-22 RX ADMIN — PIPERACILLIN AND TAZOBACTAM 3375 MG: 3; .375 INJECTION, POWDER, LYOPHILIZED, FOR SOLUTION INTRAVENOUS at 16:02

## 2021-11-22 RX ADMIN — MIDAZOLAM 2 MG: 1 INJECTION INTRAMUSCULAR; INTRAVENOUS at 10:55

## 2021-11-22 RX ADMIN — SODIUM CHLORIDE 1000 ML: 9 INJECTION, SOLUTION INTRAVENOUS at 06:00

## 2021-11-22 RX ADMIN — Medication 4 MG: at 11:39

## 2021-11-22 RX ADMIN — FENTANYL CITRATE 100 MCG: 50 INJECTION, SOLUTION INTRAMUSCULAR; INTRAVENOUS at 11:01

## 2021-11-22 RX ADMIN — Medication 100 MG: at 11:01

## 2021-11-22 RX ADMIN — SODIUM CHLORIDE: 9 INJECTION, SOLUTION INTRAVENOUS at 11:40

## 2021-11-22 RX ADMIN — MORPHINE SULFATE 2 MG: 2 INJECTION, SOLUTION INTRAMUSCULAR; INTRAVENOUS at 13:54

## 2021-11-22 ASSESSMENT — ENCOUNTER SYMPTOMS
RHINORRHEA: 0
VOICE CHANGE: 0
BACK PAIN: 0
TROUBLE SWALLOWING: 0
ABDOMINAL PAIN: 1
CHOKING: 0
NAUSEA: 0
WHEEZING: 0
PHOTOPHOBIA: 0
EYE DISCHARGE: 0
CONSTIPATION: 0
SHORTNESS OF BREATH: 0
EYE ITCHING: 0
EYE PAIN: 0
DIARRHEA: 0
ABDOMINAL DISTENTION: 0
CHEST TIGHTNESS: 0
SORE THROAT: 0
COUGH: 0
ALLERGIC/IMMUNOLOGIC NEGATIVE: 1
BLOOD IN STOOL: 0
VOMITING: 0
RESPIRATORY NEGATIVE: 1
RECTAL PAIN: 0
SINUS PRESSURE: 0
EYE REDNESS: 0

## 2021-11-22 ASSESSMENT — PAIN SCALES - GENERAL
PAINLEVEL_OUTOF10: 6
PAINLEVEL_OUTOF10: 0
PAINLEVEL_OUTOF10: 8
PAINLEVEL_OUTOF10: 6
PAINLEVEL_OUTOF10: 8

## 2021-11-22 ASSESSMENT — PULMONARY FUNCTION TESTS
PIF_VALUE: 17
PIF_VALUE: 13
PIF_VALUE: 13
PIF_VALUE: 18
PIF_VALUE: 13
PIF_VALUE: 15
PIF_VALUE: 1
PIF_VALUE: 13
PIF_VALUE: 2
PIF_VALUE: 0
PIF_VALUE: 13
PIF_VALUE: 17
PIF_VALUE: 15
PIF_VALUE: 16
PIF_VALUE: 13
PIF_VALUE: 13
PIF_VALUE: 18
PIF_VALUE: 17
PIF_VALUE: 14
PIF_VALUE: 18
PIF_VALUE: 14
PIF_VALUE: 14
PIF_VALUE: 17
PIF_VALUE: 14
PIF_VALUE: 14
PIF_VALUE: 17
PIF_VALUE: 15
PIF_VALUE: 17
PIF_VALUE: 13
PIF_VALUE: 14
PIF_VALUE: 14
PIF_VALUE: 17
PIF_VALUE: 2
PIF_VALUE: 13
PIF_VALUE: 14
PIF_VALUE: 17
PIF_VALUE: 18
PIF_VALUE: 2
PIF_VALUE: 16
PIF_VALUE: 17
PIF_VALUE: 13
PIF_VALUE: 13
PIF_VALUE: 5
PIF_VALUE: 17
PIF_VALUE: 13
PIF_VALUE: 3
PIF_VALUE: 15
PIF_VALUE: 14
PIF_VALUE: 0
PIF_VALUE: 16

## 2021-11-22 ASSESSMENT — PAIN DESCRIPTION - PAIN TYPE
TYPE: ACUTE PAIN

## 2021-11-22 ASSESSMENT — PAIN DESCRIPTION - FREQUENCY
FREQUENCY: CONTINUOUS
FREQUENCY: CONTINUOUS

## 2021-11-22 ASSESSMENT — PAIN DESCRIPTION - LOCATION
LOCATION: ABDOMEN

## 2021-11-22 ASSESSMENT — PAIN DESCRIPTION - ORIENTATION
ORIENTATION: RIGHT
ORIENTATION: RIGHT;LOWER
ORIENTATION: LOWER;LEFT;MID

## 2021-11-22 ASSESSMENT — PAIN DESCRIPTION - DESCRIPTORS
DESCRIPTORS: ACHING;CRAMPING;DISCOMFORT;SHARP
DESCRIPTORS: ACHING

## 2021-11-22 ASSESSMENT — PAIN DESCRIPTION - DIRECTION: RADIATING_TOWARDS: BACK

## 2021-11-22 ASSESSMENT — PAIN - FUNCTIONAL ASSESSMENT
PAIN_FUNCTIONAL_ASSESSMENT: ACTIVITIES ARE NOT PREVENTED
PAIN_FUNCTIONAL_ASSESSMENT: ACTIVITIES ARE NOT PREVENTED

## 2021-11-22 ASSESSMENT — PAIN DESCRIPTION - PROGRESSION
CLINICAL_PROGRESSION: NOT CHANGED
CLINICAL_PROGRESSION: NOT CHANGED

## 2021-11-22 ASSESSMENT — PAIN DESCRIPTION - ONSET
ONSET: ON-GOING
ONSET: ON-GOING

## 2021-11-22 NOTE — H&P
Rosey Cuellar MD  General Surgery H&P      Pt Name: Sean Gunderson  MRN: [id number]  YOB: 2000  Date of evaluation: 11/22/2021  Primary Care Physician: Tristin Werner MD  Patient evaluated at the request of  Dr. Margie Beltran  Reason for evaluation: appendicitis     Chief complaint:      Chief Complaint   Patient presents with    Abdominal Pain        SUBJECTIVE:     HPI:    Arliserena Mohamud is a 22 y. o.female who presents with abdominal pain. The pain is described as hot and stabbing, and is moderate to severe in intensity. Pain is located in the RLQ without radiation. Onset was 18 hours ago. Symptoms have been gradually worsening since. Aggravating factors include movement. Alleviating factors include none Associated symptoms include anorexia. Review of Systems:  Review of Systems   Constitutional: Negative. HENT: Negative. Respiratory: Negative. Cardiovascular: Negative. Gastrointestinal: Positive for abdominal pain. Negative for constipation and rectal pain. Genitourinary: Negative. Musculoskeletal: Negative. Skin: Negative. Allergic/Immunologic: Negative. Neurological: Negative. Hematological: Negative. Psychiatric/Behavioral: Negative for agitation and confusion. Past Medical History  Past Medical History:   Diagnosis Date    Anxiety     Depression 9/30/2021       Past Surgical History  History reviewed. No pertinent surgical history. Medications  Prior to Admission medications    Medication Sig Start Date End Date Taking? Authorizing Provider   sertraline (ZOLOFT) 50 MG tablet Take 1 tablet by mouth daily 9/30/21   Tristin Werner MD   levonorgestrel-ethinyl estradiol Selinda Bon) 0.15-30 MG-MCG per tablet Take 1 tablet by mouth daily    Historical Provider, MD    Scheduled Meds:   piperacillin-tazobactam  3,375 mg IntraVENous Once     Continuous Infusions:  PRN Meds:.     Allergies  No Known Allergies     Family History  Family History   Problem Relation Age of Onset    Migraines Mother     High Blood Pressure Maternal Grandmother     High Blood Pressure Maternal Grandfather     Asthma Neg Hx     Cancer Neg Hx     Depression Neg Hx     Diabetes Neg Hx     Heart Disease Neg Hx     Kidney Disease Neg Hx     Stroke Neg Hx        Social History  Social History     Socioeconomic History    Marital status: Single     Spouse name: None    Number of children: None    Years of education: None    Highest education level: None   Occupational History    None   Tobacco Use    Smoking status: Never Smoker    Smokeless tobacco: Never Used   Substance and Sexual Activity    Alcohol use: No    Drug use: No    Sexual activity: Never   Other Topics Concern    None   Social History Narrative    None     Social Determinants of Health     Financial Resource Strain: Low Risk     Difficulty of Paying Living Expenses: Not hard at all   Food Insecurity: No Food Insecurity    Worried About Running Out of Food in the Last Year: Never true    Chanell of Food in the Last Year: Never true   Transportation Needs:     Lack of Transportation (Medical): Not on file    Lack of Transportation (Non-Medical):  Not on file   Physical Activity:     Days of Exercise per Week: Not on file    Minutes of Exercise per Session: Not on file   Stress:     Feeling of Stress : Not on file   Social Connections:     Frequency of Communication with Friends and Family: Not on file    Frequency of Social Gatherings with Friends and Family: Not on file    Attends Scientology Services: Not on file    Active Member of Clubs or Organizations: Not on file    Attends Club or Organization Meetings: Not on file    Marital Status: Not on file   Intimate Partner Violence:     Fear of Current or Ex-Partner: Not on file    Emotionally Abused: Not on file    Physically Abused: Not on file    Sexually Abused: Not on file   Housing Stability:     Unable to Pay for Housing in the Last Year: Not on file    Number of Places Lived in the Last Year: Not on file    Unstable Housing in the Last Year: Not on file         OBJECTIVE:   CURRENT VITALS:  height is 5' 4\" (1.626 m) and weight is 130 lb (59 kg). Her oral temperature is 97.9 °F (36.6 °C). Her blood pressure is 103/61 and her pulse is 91. Her respiration is 20 and oxygen saturation is 99%. Body mass index is 22.31 kg/m². Physical Exam  Constitutional:       Appearance: Normal appearance. She is ill-appearing. HENT:      Head: Normocephalic and atraumatic. Eyes:      Extraocular Movements: Extraocular movements intact. Pupils: Pupils are equal, round, and reactive to light. Cardiovascular:      Rate and Rhythm: Normal rate. Pulses: Normal pulses. Pulmonary:      Effort: Pulmonary effort is normal. No respiratory distress. Abdominal:      Palpations: Abdomen is soft. Tenderness: There is abdominal tenderness (right lower quadrant ). There is guarding (volunatary ). Musculoskeletal:         General: No swelling. Normal range of motion. Cervical back: Normal range of motion. Skin:     General: Skin is warm. Coloration: Skin is not jaundiced. Neurological:      General: No focal deficit present. Mental Status: She is alert and oriented to person, place, and time. Psychiatric:         Mood and Affect: Mood normal.         Behavior: Behavior normal.          LABS:     Recent Labs     11/22/21  0535   WBC 9.6   HGB 15.0   HCT 46.1         K 3.7      CO2 23   BUN 8   CREATININE 0.8   MG 1.8   CALCIUM 9.4   AST 20   ALT 10*   BILITOT 0.3   BILIDIR <0.2   LIPASE 21.6   NITRU NEGATIVE   COLORU YELLOW   BACTERIA FEW       RADIOLOGY:   I have personally reviewed the following films:  CT ABDOMEN PELVIS W IV CONTRAST Additional Contrast? None   Final Result    Early acute appendicitis. **This report has been created using voice recognition software.  It may contain minor errors which are inherent in voice recognition technology. **      Final report electronically signed by Dr. Philomena Sharpe on 11/22/2021 7:13 AM          IMPRESSIONS:   23 yo female with uncomplicated appendicitis without signs of perforation   1. does not have any pertinent problems on file. PLANS:   1. Admit type: Observation  2. It is expected this patient's LOS will be: Less than 2 midnights  3. Anticipated Disposition Upon Discharge: Home  4. Analgesics and antiemetics on a prn basis  5. IV hydration  6. Empiric antibiotic coverage- zosyn given   7. DVT prophylaxis with SCD's  8. Home Medications as ordered  9.  Risks benefits and alternatives to srugery were discussed with the patient, all questions answered and she would like to proceed with laparoscopic possible open appendectomy   Electronically signed by Derian Norton MD on 11/22/2021 at 8:39 AM

## 2021-11-22 NOTE — OP NOTE
mesentery was divided with a ligasure device. . The appendix was divided with an EndoGIA stapler at its base. The appendix was removed from the abdomen with the aid of an endocatch bag. The staple lines were inspected, it was intact and there was no bleeding. Pneumoperitoneum was evacuated. The 12mm trocar site fascia was closed with an 0- vicryl suture in a figure of eight fashion. All skin incisions were closed with subdermal Monocryl. Surgical glue was applied. All sponge and needle counts were correct. The patient was awoken from anesthesia and transported to the PACU in stable condition. SPECIMENS: The appendix sent to pathology for analysis.      BLOOD LOSS: 5ml    COMPLICATIONS: none      Electronically signed by Verner Nunnery, MD on 11/22/2021 at 11:46 AM

## 2021-11-22 NOTE — ED NOTES
Pt medicated per MAR at this time and updated on POC.  Denies any additional needs at this time, call light in place      Nils Banks  11/22/21 0955

## 2021-11-22 NOTE — ED PROVIDER NOTES
[facility]  eMERGENCY dEPARTMENT eNCOUnter          CHIEF COMPLAINT       Chief Complaint   Patient presents with    Abdominal Pain       Nurses Notes reviewed and I agree except as noted in the HPI. HISTORY OF PRESENT ILLNESS    Hugo iDa is a 22 y.o. female who presents abdominal pain. Patient states it started last night. Around 11:00. To continue to worsen today. Patient states the only other time she has had abdominal pain like this is when she was eating pork. She states she has some intolerance to pork. Patient has not been diagnosed with irritable bowel inflammatory bowel disease or diverticulitis. Patient states that she has had bowel movements. They seem to give her relief. Patient states that there has been no blood in the urine or blood in the stool. Patient is concerned because the pain is in the umbilical area. She still has her appendix. Patient denies being pregnant. She denies any drugs or alcohol. Currently the patient is resting comfortably on the cot no apparent distress no other physical complaints at this time. REVIEW OF SYSTEMS     Review of Systems   Constitutional: Negative for activity change, appetite change, diaphoresis, fatigue and unexpected weight change. HENT: Negative for congestion, ear discharge, ear pain, hearing loss, rhinorrhea, sinus pressure, sore throat, trouble swallowing and voice change. Eyes: Negative for photophobia, pain, discharge, redness and itching. Respiratory: Negative for cough, choking, chest tightness, shortness of breath and wheezing. Cardiovascular: Negative for chest pain, palpitations and leg swelling. Gastrointestinal: Positive for abdominal pain. Negative for abdominal distention, blood in stool, constipation, diarrhea, nausea and vomiting. Endocrine: Negative for polydipsia, polyphagia and polyuria.    Genitourinary: Negative for decreased urine volume, difficulty urinating, dysuria, enuresis, frequency, hematuria and urgency. Musculoskeletal: Negative for arthralgias, back pain, gait problem, myalgias, neck pain and neck stiffness. Skin: Negative for pallor and rash. Allergic/Immunologic: Negative for immunocompromised state. Neurological: Negative for dizziness, tremors, seizures, syncope, facial asymmetry, weakness, light-headedness, numbness and headaches. Hematological: Negative for adenopathy. Does not bruise/bleed easily. Psychiatric/Behavioral: Negative for agitation, hallucinations and suicidal ideas. The patient is not nervous/anxious. PAST MEDICAL HISTORY    has a past medical history of Anxiety and Depression. SURGICAL HISTORY      has no past surgical history on file. CURRENT MEDICATIONS       Previous Medications    LEVONORGESTREL-ETHINYL ESTRADIOL (KURVELO) 0.15-30 MG-MCG PER TABLET    Take 1 tablet by mouth daily    SERTRALINE (ZOLOFT) 50 MG TABLET    Take 1 tablet by mouth daily       ALLERGIES     has No Known Allergies. FAMILY HISTORY     She indicated that her mother is alive. She indicated that her father is alive. She indicated that the status of her maternal grandmother is unknown. She indicated that the status of her maternal grandfather is unknown. She indicated that the status of her neg hx is unknown.   family history includes High Blood Pressure in her maternal grandfather and maternal grandmother; Migraines in her mother. SOCIAL HISTORY      reports that she has never smoked. She has never used smokeless tobacco. She reports that she does not drink alcohol and does not use drugs. PHYSICAL EXAM     INITIAL VITALS:  height is 5' 4\" (1.626 m) and weight is 130 lb (59 kg). Her oral temperature is 97.9 °F (36.6 °C). Her blood pressure is 129/68 and her pulse is 93. Her respiration is 18 and oxygen saturation is 99%. Physical Exam  Vitals and nursing note reviewed. Constitutional:       General: She is not in acute distress.      Appearance: She is well-developed. She is not diaphoretic. HENT:      Head: Normocephalic and atraumatic. Right Ear: External ear normal.      Left Ear: External ear normal.      Nose: Nose normal.      Mouth/Throat:      Pharynx: No oropharyngeal exudate. Eyes:      General: No scleral icterus. Right eye: No discharge. Left eye: No discharge. Conjunctiva/sclera: Conjunctivae normal.      Pupils: Pupils are equal, round, and reactive to light. Neck:      Thyroid: No thyromegaly. Vascular: No JVD. Trachea: No tracheal deviation. Cardiovascular:      Rate and Rhythm: Normal rate and regular rhythm. Heart sounds: Normal heart sounds, S1 normal and S2 normal. No murmur heard. No friction rub. No gallop. Pulmonary:      Effort: Pulmonary effort is normal.      Breath sounds: Normal breath sounds. No stridor. No wheezing, rhonchi or rales. Chest:      Chest wall: No tenderness. Abdominal:      General: Bowel sounds are normal. There is no distension. Palpations: Abdomen is soft. There is no mass. Tenderness: There is abdominal tenderness in the periumbilical area. There is no right CVA tenderness, left CVA tenderness, guarding or rebound. Negative signs include Jones's sign, Rovsing's sign, McBurney's sign, psoas sign and obturator sign. Hernia: No hernia is present. Musculoskeletal:         General: No tenderness. Normal range of motion. Cervical back: Normal range of motion and neck supple. Lymphadenopathy:      Cervical: No cervical adenopathy. Skin:     General: Skin is warm and dry. Findings: No bruising, ecchymosis, lesion or rash. Neurological:      Mental Status: She is alert and oriented to person, place, and time. Cranial Nerves: No cranial nerve deficit. Coordination: Coordination normal.      Deep Tendon Reflexes: Reflexes are normal and symmetric.    Psychiatric:         Speech: Speech normal.         Behavior: Behavior normal.         Thought Content: Thought content normal.         Judgment: Judgment normal.           DIFFERENTIAL DIAGNOSIS:   Differential diagnosis discussed extensively at bedside with the patient including but not limited to gastritis GERD small bowel obstruction irritable bowel Crohn's disease ulcerative colitis colitis appendicitis pancreatitis    DIAGNOSTIC RESULTS     EKG: All EKG's are interpreted by the Emergency Department Physician who either signs or Co-signs this chart in the absence of a cardiologist.  None    RADIOLOGY: non-plain film images(s) such as CT, Ultrasound and MRI are read by the radiologist.  CT ABDOMEN PELVIS W IV CONTRAST Additional Contrast? None   Final Result    Early acute appendicitis. **This report has been created using voice recognition software. It may contain minor errors which are inherent in voice recognition technology. **      Final report electronically signed by Dr. Tahir Cotton on 11/22/2021 7:13 AM            LABS:   Labs Reviewed   CBC WITH AUTO DIFFERENTIAL - Abnormal; Notable for the following components:       Result Value    MPV 9.0 (*)     All other components within normal limits   HEPATIC FUNCTION PANEL - Abnormal; Notable for the following components:    ALT 10 (*)     Total Protein 8.3 (*)     All other components within normal limits   URINE WITH REFLEXED MICRO - Abnormal; Notable for the following components:    Leukocyte Esterase, Urine SMALL (*)     All other components within normal limits   BASIC METABOLIC PANEL   LIPASE   MAGNESIUM   HCG, SERUM, QUALITATIVE   URINE DRUG SCREEN   ANION GAP   OSMOLALITY   GLOMERULAR FILTRATION RATE, ESTIMATED       EMERGENCY DEPARTMENT COURSE:   Vitals:    Vitals:    11/22/21 0530 11/22/21 0534   BP: 129/68    Pulse: 108 93   Resp: 18    Temp: 97.9 °F (36.6 °C)    TempSrc: Oral    SpO2: 99%    Weight: 130 lb (59 kg)    Height: 5' 4\" (1.626 m)      Patient was assessed at bedside appropriate labs and imaging were ordered. Patient was given fluid Zofran Protonix at bedside. I reviewed the patient's lab here all within normal limits. At this point the patient was pending a CAT scan. I came to the end of my shift. Patient is signed out to my morning colleague in stable condition. CRITICAL CARE:   None     CONSULTS:  None    PROCEDURES:  None    FINAL IMPRESSION      1. Abdominal pain, unspecified abdominal location          DISPOSITION/PLAN   ED observation/signout    PATIENT REFERRED TO:  No follow-up provider specified.     DISCHARGE MEDICATIONS:  New Prescriptions    No medications on file       (Please note that portions of this note were completed with a voice recognition program.  Efforts were made to edit the dictations but occasionally words are mis-transcribed.)    Mary Cheng, [address],   11/22/21 5092

## 2021-11-22 NOTE — PROGRESS NOTES
Pt admitted to  5K2 via via cart/stretcher from ED. Complaints: Appendicitis, abdominal pain. IV in the antecubital left, condition patent and no redness. IV site free of s/s of infection or infiltration. Vital signs obtained. Assessment and data collection initiated. Two nurse skin assessment performed by Delfin Cameron and Susan Blanca. Oriented to room. Policies and procedures for  explained. All questions answered with no further questions at this time. Fall prevention and safety brochure discussed with patient. Bed alarm on. Call light in reach. Oriented to room. Jadon Rosa, RN, RN 11/22/2021 2:12 PM     Explained patients right to have family, representative or physician notified of their admission. Patient has Declined for physician to be notified. Patient has Declined for family/representative to be notified.

## 2021-11-22 NOTE — ANESTHESIA POSTPROCEDURE EVALUATION
Department of Anesthesiology  Postprocedure Note    Patient: Sean Gunderson  MRN: [id number]  YOB: 2000  Date of evaluation: 11/22/2021  Time:  2:02 PM     Procedure Summary     Date: 11/22/21 Room / Location: [address] [place] [address] DAVID Andersen    Anesthesia Start: 5234 Anesthesia Stop: 8495    Procedure: LAPAROSCOPIC APPENDECTOMY (N/A ) Diagnosis: (ACUTE APPENDICITIS)    Surgeons: Sarah Koch MD Responsible Provider: Lili Hameed DO    Anesthesia Type: general ASA Status: 1 - Emergent          Anesthesia Type: general    Leo Phase I: Leo Score: 10    Leo Phase II:      Last vitals: Reviewed and per EMR flowsheets.        Anesthesia Post Evaluation    Patient location during evaluation: PACU  Patient participation: complete - patient participated  Level of consciousness: awake  Airway patency: patent  Nausea & Vomiting: no nausea  Complications: no  Cardiovascular status: hemodynamically stable  Respiratory status: acceptable  Hydration status: stable

## 2021-11-22 NOTE — ED NOTES
Pt to ED via private with c/o abdominal pain that started yesterday. Pt states she was having difficulty with bowel movements, only getting out a little bit at a time. Pt states she took a laxative and had diarrhea which relieved the pain for a little bit, but then the pain came back and woke her up this morning. Pt denies any other symptoms at this time. Urine sample collected and sent to lab.  MARKELL Milian  11/22/21 2824

## 2021-11-22 NOTE — PROGRESS NOTES
Patient's ring and nose rings left with mom in a  Cup with patient sticker. Belly button ring was placed in a separate cup in patient's chart with a sticker. Pants placed in a bag with sticker. Pants and belly button ring brought to PACU.

## 2021-11-22 NOTE — PROGRESS NOTES
1150 pt arrived to PACU, awakens to voice. Denies pain. VSS  1205 pt awake in bed, denies pain or nausea. VSS  1220 meets criteria for discharge from PACU, placed transport to 02  1244 transported to [facility] with belly button ring in chart and clothes on bed.

## 2021-11-22 NOTE — ANESTHESIA PRE PROCEDURE
Department of Anesthesiology  Preprocedure Note       Name:  Zoraida Jeffers   Age:  22 y.o.  :  2000                                          MRN:  [id number]         Date:  2021      Surgeon: Aamir Tijerina): Ez Taylor MD    Procedure: Procedure(s):  LAPAROSCOPIC APPENDECTOMY    Medications prior to admission:   Prior to Admission medications    Medication Sig Start Date End Date Taking? Authorizing Provider   sertraline (ZOLOFT) 50 MG tablet Take 1 tablet by mouth daily 21   Eron Wolf MD   levonorgestrel-ethinyl estradiol Idelia Angelucci) 0.15-30 MG-MCG per tablet Take 1 tablet by mouth daily    Historical Provider, MD       Current medications:    Current Facility-Administered Medications   Medication Dose Route Frequency Provider Last Rate Last Admin    piperacillin-tazobactam (ZOSYN) 3,375 mg in dextrose 5 % 50 mL IVPB (mini-bag)  3,375 mg IntraVENous Once [place] DO Dharmesh        sodium chloride flush 0.9 % injection 5-40 mL  5-40 mL IntraVENous 2 times per day Ez Taylor MD        sodium chloride flush 0.9 % injection 5-40 mL  5-40 mL IntraVENous PRN Ez Taylor MD        0.9 % sodium chloride infusion  25 mL IntraVENous PRN Ez Taylor MD        0.9 % sodium chloride infusion   IntraVENous Continuous Ez Taylor  mL/hr at 21 0958 New Bag at 21 0958    morphine (PF) injection 2 mg  2 mg IntraVENous Q4H PRN Ez Taylor MD        [facility]ron [facility]) injection 4 mg  4 mg IntraVENous Q6H PRN Ez Taylor MD        piperacillin-tazobactam (ZOSYN) 3,375 mg in dextrose 5 % 50 mL IVPB extended infusion (mini-bag)  3,375 mg IntraVENous Jackie Pascal MD           Allergies:  No Known Allergies    Problem List:    Patient Active Problem List   Diagnosis Code    Depression F32. A    Anxiety F41.9    Acute appendicitis K35.80       Past Medical History:        Diagnosis Date    Anxiety     Depression 2021       Past Surgical History:  History reviewed. No pertinent surgical history. Social History:    Social History     Tobacco Use    Smoking status: Never Smoker    Smokeless tobacco: Never Used   Substance Use Topics    Alcohol use: No                                Counseling given: Not Answered      Vital Signs (Current):   Vitals:    11/22/21 0530 11/22/21 0534 11/22/21 0725 11/22/21 0930   BP: 129/68  103/61 (!) 108/55   Pulse: 108 93 91 98   Resp: 18  20    Temp: 97.9 °F (36.6 °C)   98.7 °F (37.1 °C)   TempSrc: Oral      SpO2: 99%  99% 98%   Weight: 130 lb (59 kg)      Height: 5' 4\" (1.626 m)                                                 BP Readings from Last 3 Encounters:   11/22/21 (!) 108/55   09/30/21 118/70   12/18/19 (!) 109/56       NPO Status:                                                                                 BMI:   Wt Readings from Last 3 Encounters:   11/22/21 130 lb (59 kg)   09/30/21 130 lb 12.8 oz (59.3 kg)   12/18/19 128 lb (58.1 kg) (55 %, Z= 0.13)*     * Growth percentiles are based on CDC (Girls, 2-20 Years) data. Body mass index is 22.31 kg/m². CBC:   Lab Results   Component Value Date    WBC 9.6 11/22/2021    RBC 5.14 11/22/2021    RBC 4.39 08/02/2011    HGB 15.0 11/22/2021    HCT 46.1 11/22/2021    MCV 89.7 11/22/2021    RDW 13.2 07/16/2017     11/22/2021       CMP:   Lab Results   Component Value Date     11/22/2021    K 3.7 11/22/2021     11/22/2021    CO2 23 11/22/2021    BUN 8 11/22/2021    CREATININE 0.8 11/22/2021    LABGLOM >90 11/22/2021    GLUCOSE 91 11/22/2021    PROT 8.3 11/22/2021    CALCIUM 9.4 11/22/2021    BILITOT 0.3 11/22/2021    ALKPHOS 82 11/22/2021    AST 20 11/22/2021    ALT 10 11/22/2021       POC Tests: No results for input(s): POCGLU, POCNA, POCK, POCCL, POCBUN, POCHEMO, POCHCT in the last 72 hours.     Coags: No results found for: PROTIME, INR, APTT    HCG (If Applicable):   Lab Results   Component Value Date    PREGTESTUR NEGATIVE 10/19/2015    PREGSERUM NEGATIVE 11/22/2021        ABGs: No results found for: PHART, PO2ART, ICH6EBG, TDL7QQV, BEART, S3KGWVZS     Type & Screen (If Applicable):  No results found for: LABABO, LABRH    Drug/Infectious Status (If Applicable):  No results found for: HIV, HEPCAB    COVID-19 Screening (If Applicable): No results found for: COVID19        Anesthesia Evaluation  Patient summary reviewed and Nursing notes reviewed no history of anesthetic complications:   Airway: Mallampati: I        Dental:          Pulmonary: breath sounds clear to auscultation                             Cardiovascular:  Exercise tolerance: good (>4 METS),           Rhythm: regular  Rate: normal                    Neuro/Psych:               GI/Hepatic/Renal:             Endo/Other:                     Abdominal:       Abdomen: soft. Vascular: Other Findings:             Anesthesia Plan      general     ASA 1 - emergent       Induction: intravenous. MIPS: Postoperative opioids intended and Prophylactic antiemetics administered. Anesthetic plan and risks discussed with patient and mother. Plan discussed with CRNA.                   67 Gopi Muhammad DO   11/22/2021

## 2021-11-23 VITALS
WEIGHT: 130 LBS | SYSTOLIC BLOOD PRESSURE: 119 MMHG | BODY MASS INDEX: 22.2 KG/M2 | RESPIRATION RATE: 16 BRPM | HEIGHT: 64 IN | OXYGEN SATURATION: 98 % | HEART RATE: 67 BPM | DIASTOLIC BLOOD PRESSURE: 61 MMHG | TEMPERATURE: 98.4 F

## 2021-11-23 PROCEDURE — 2580000003 HC RX 258: Performed by: SURGERY

## 2021-11-23 PROCEDURE — 99024 POSTOP FOLLOW-UP VISIT: CPT | Performed by: SURGERY

## 2021-11-23 PROCEDURE — 6360000002 HC RX W HCPCS: Performed by: SURGERY

## 2021-11-23 PROCEDURE — G0378 HOSPITAL OBSERVATION PER HR: HCPCS

## 2021-11-23 PROCEDURE — 96376 TX/PRO/DX INJ SAME DRUG ADON: CPT

## 2021-11-23 RX ORDER — HYDROCODONE BITARTRATE AND ACETAMINOPHEN 5; 325 MG/1; MG/1
1 TABLET ORAL EVERY 6 HOURS PRN
Qty: 12 TABLET | Refills: 0 | Status: SHIPPED | OUTPATIENT
Start: 2021-11-23 | End: 2021-11-26

## 2021-11-23 RX ADMIN — PIPERACILLIN AND TAZOBACTAM 3375 MG: 3; .375 INJECTION, POWDER, LYOPHILIZED, FOR SOLUTION INTRAVENOUS at 00:53

## 2021-11-23 RX ADMIN — MORPHINE SULFATE 2 MG: 2 INJECTION, SOLUTION INTRAMUSCULAR; INTRAVENOUS at 03:54

## 2021-11-23 RX ADMIN — PIPERACILLIN AND TAZOBACTAM 3375 MG: 3; .375 INJECTION, POWDER, LYOPHILIZED, FOR SOLUTION INTRAVENOUS at 10:17

## 2021-11-23 RX ADMIN — MORPHINE SULFATE 2 MG: 2 INJECTION, SOLUTION INTRAMUSCULAR; INTRAVENOUS at 10:05

## 2021-11-23 ASSESSMENT — PAIN SCALES - GENERAL
PAINLEVEL_OUTOF10: 6
PAINLEVEL_OUTOF10: 8

## 2021-11-23 NOTE — PROGRESS NOTES
Patient discharged home with mom. Patient verbalizes understanding of discharge instructions and importance of washing daily with CHG soap to prevent infection. Chart tore down and placed in yellow bin.

## 2021-11-23 NOTE — DISCHARGE SUMMARY
[facility] [address] SUMMARY  Pt Name: Chantal Benson  MRN: [id number]  YOB: 2000  Primary Care Physician: Sayda Arceo MD  Admit date:  11/22/2021  5:21 AM  Discharge date:  No discharge date for patient encounter. Disposition: home  Admitting Diagnosis:   1. Acute appendicitis with generalized peritonitis, without abscess, unspecified whether gangrene present, unspecified whether perforation present    2. Acute appendicitis, unspecified acute appendicitis type      Discharge Diagnosis:   Patient Active Problem List   Diagnosis Code    Depression F32. A    Anxiety F41.9    Acute appendicitis K35.80     Discharge condition:  good  Consultants:  none  Procedures/Diagnostic Test:  Gissel[employer]fany 1521 Course: Jake Moore originally presented to the hospital on 11/22/2021  5:21 AM with acute appendicitis . She was started on antibiotics and taken to the OR for uncomplicated laparscopic appendectomy  At time of discharge, Jake Moore was tolerating a regular diet, having bowel movements,ambulating on her own accord and had adequate analgesia on oral pain medications, and had no signs of symptoms of complications. PHYSICAL EXAMINATION   Discharge Vitals:  height is 5' 4\" (1.626 m) and weight is 130 lb (59 kg). Her oral temperature is 98.4 °F (36.9 °C). Her blood pressure is 119/61 and her pulse is 67. Her respiration is 16 and oxygen saturation is 98%.    General appearance - alert, well appearing, and in no distress  Chest - clear to ausculation  Heart - normal rate and regular rhythm  Abdomen - soft, incisional tenderness only, bowel sounds present  Neurological - motor and sensory grossly normal bilaterally  Musculoskeletal - full range of motion without pain  Extremities - peripheral pulses normal, no pedal edema, no clubbing or cyanosis  Incision: healing well, no drainage  LABS     Recent Labs     11/22/21  0535   WBC 9.6   HGB 15.0   HCT 46.1         K 3.7      CO2 23   BUN 8   CREATININE 0.8     DISCHARGE INSTRUCTIONS   Discharge Medications:      Medication List      START taking these medications    HYDROcodone-acetaminophen 5-325 MG per tablet  Commonly known as: Norco  Take 1 tablet by mouth every 6 hours as needed for Pain for up to 3 days. Intended supply: 3 days. Take lowest dose possible to manage pain        CONTINUE taking these medications    Kurvelo 0.15-30 MG-MCG per tablet  Generic drug: levonorgestrel-ethinyl estradiol     sertraline 50 MG tablet  Commonly known as: ZOLOFT  Take 1 tablet by mouth daily           Where to Get Your Medications      These medications were sent to [facility] [id number] #[id number] - [place], [address] [facility] [phone]  . [place] , [facility] [address]    Phone: [phone]   · HYDROcodone-acetaminophen 5-325 MG per tablet       Diet: diet as tolerated  Activity: no lifting more than 10-20 lbs for next two weeks  Wound Care: Daily and as needed  Follow-up:  in the next few weeks with Jackie Darnell MD, Follow up with Gordy Price MD in 1-2 weeks.   Time Spent for discharge: 20 minutes       Electronically signed by Gordy Price MD on 11/23/2021 at 7:57 AM

## 2021-11-23 NOTE — CARE COORDINATION
DISCHARGE PLANNING EVALUATION: OP/OBSERVATION        11/23/21, 8:50 AM EST    [place] November       Admitted from: ER 11/22/2021/ 5414   Location: [facility]02/002-A Reason for admit: Acute appendicitis [K35.80]  Acute appendicitis with generalized peritonitis, without abscess, unspecified whether gangrene present, unspecified whether perforation present [K35.20]   Admit order signed?: n/a    Procedure: 11/22/2021 Laparoscopic appendectomy    Pertinent Info/Orders/Treatment Plan:  IV fluids, Zosyn, prn pain medications and Zofran, general diet, ambulate, SCD's, discharge orders on chart. PCP: Vannesa Serna MD    Patient Goals/Plan/Treatment Preferences: Met with Juan Jose Salgado and her mother present at bedside. Juan Jose Salgado resides at home with her parents. She has insurance and a PCP. Her mother will  her home at discharge. She is able to afford her medications and does not have a need for DME. Juan Jose Salgado is independent managing her health care needs. She declines HH.     11/23/21, 9:20 AM EST    Patient goals/plan/ treatment preferences discussed by  and . Patient goals/plan/ treatment preferences reviewed with patient/ family. Patient/ family verbalize understanding of discharge plan and are in agreement with goal/plan/treatment preferences. Understanding was demonstrated using the teach back method. AVS provided by RN at time of discharge, which includes all necessary medical information pertaining to the patients current course of illness, treatment, post-discharge goals of care, and treatment preferences. Transportation/Food Security/Housekeeping Addressed:  No issues identified.

## 2021-11-24 ENCOUNTER — TELEPHONE (OUTPATIENT)
Dept: FAMILY MEDICINE CLINIC | Age: 21
End: 2021-11-24

## 2021-11-24 NOTE — TELEPHONE ENCOUNTER
[facility] Transitions Initial Follow Up Call    Outreach made within 2 business days of discharge: Yes    Patient: Antony Aponte Patient : 2000   MRN: [id number]  Reason for Admission: There are no discharge diagnoses documented for the most recent discharge. Discharge Date: 21       Spoke with: patient Lucero Lou     Discharge department/facility: [facility]    TCM Interactive Patient Contact:  Was patient able to fill all prescriptions: Yes  Was patient instructed to bring all medications to the follow-up visit: Yes  Is patient taking all medications as directed in the discharge summary?  yes  Does patient understand their discharge instructions: Yes  Does patient have questions or concerns that need addressed prior to 7-14 day follow up office visit: no    Scheduled appointment with PCP within 7-14 days    Follow Up  Future Appointments   Date Time Provider Tessy Mosquera   2021 10:45 AM Danielito Salinas MD [address] [place] [address]

## 2021-12-10 ENCOUNTER — OFFICE VISIT (OUTPATIENT)
Dept: SURGERY | Age: 21
End: 2021-12-10

## 2021-12-10 VITALS
OXYGEN SATURATION: 91 % | DIASTOLIC BLOOD PRESSURE: 62 MMHG | TEMPERATURE: 97.2 F | RESPIRATION RATE: 18 BRPM | BODY MASS INDEX: 21.31 KG/M2 | WEIGHT: 124.8 LBS | SYSTOLIC BLOOD PRESSURE: 118 MMHG | HEIGHT: 64 IN | HEART RATE: 82 BPM

## 2021-12-10 DIAGNOSIS — Z09 POSTOP CHECK: Primary | ICD-10-CM

## 2021-12-10 PROCEDURE — 99024 POSTOP FOLLOW-UP VISIT: CPT | Performed by: SURGERY

## 2021-12-10 NOTE — PROGRESS NOTES
MD Eloy Barboza [address] Post op Note    Pt Name: Mukund [facility] Record Number: [id number]  Date of Birth 2000   Today's Date: 12/10/2021    ASSESSMENT       ICD-10-CM    1. Postop check  Z09         PLAN   1. Making a good recovery, no post operative complications. No heavy lifting for a total of 4 weeks. See me back as needed  SUBJECTIVE   Colton Ferrell is doing well, pain controlled, tolerating a regular diet, no diarreha or fevers . Review of Systems    CURRENT MEDICATIONS     Current Outpatient Medications on File Prior to Visit   Medication Sig Dispense Refill    sertraline (ZOLOFT) 50 MG tablet Take 1 tablet by mouth daily 45 tablet 0    levonorgestrel-ethinyl estradiol (KURVELO) 0.15-30 MG-MCG per tablet Take 1 tablet by mouth daily       No current facility-administered medications on file prior to visit. OBJECTIVE   CURRENT VITALS:  height is 5' 4\" (1.626 m) and weight is 124 lb 12.8 oz (56.6 kg). Her temporal temperature is 97.2 °F (36.2 °C). Her blood pressure is 118/62 and her pulse is 82. Her respiration is 18 and oxygen saturation is 91%. Physical Exam  Constitutional:       Appearance: Normal appearance. Pulmonary:      Effort: Pulmonary effort is normal.   Abdominal:      General: There is no distension. Palpations: Abdomen is soft. Tenderness: There is no abdominal tenderness. Skin:     General: Skin is warm and dry. Neurological:      Mental Status: She is alert. Psychiatric:         Mood and Affect: Mood normal.          LABS and Pathology   Clinical Information: ACUTE APPENDICITIS     FINAL DIAGNOSIS:   Appendix, appendectomy:             Acute appendicitis.      RADIOLOGY   na    Electronically signed by Del Whitfield MD on 12/10/2021 at 10:09 AM

## 2023-03-01 ENCOUNTER — NURSE ONLY (OUTPATIENT)
Dept: LAB | Age: 23
End: 2023-03-01

## 2023-03-14 LAB — CYTOLOGY THIN PREP PAP: NORMAL

## 2023-03-20 ENCOUNTER — HOSPITAL ENCOUNTER (EMERGENCY)
Age: 23
Discharge: HOME | End: 2023-03-20
Payer: COMMERCIAL

## 2023-03-20 VITALS
HEIGHT: 65 IN | BODY MASS INDEX: 21.16 KG/M2 | OXYGEN SATURATION: 100 % | HEART RATE: 85 BPM | WEIGHT: 127 LBS | TEMPERATURE: 98.1 F | DIASTOLIC BLOOD PRESSURE: 68 MMHG | RESPIRATION RATE: 16 BRPM | SYSTOLIC BLOOD PRESSURE: 120 MMHG

## 2023-03-20 DIAGNOSIS — J02.9 ACUTE PHARYNGITIS, UNSPECIFIED ETIOLOGY: Primary | ICD-10-CM

## 2023-03-20 LAB — S PYO AG THROAT QL: NEGATIVE

## 2023-03-20 PROCEDURE — 87651 STREP A DNA AMP PROBE: CPT

## 2023-03-20 PROCEDURE — 99203 OFFICE O/P NEW LOW 30 MIN: CPT | Performed by: NURSE PRACTITIONER

## 2023-03-20 PROCEDURE — 99213 OFFICE O/P EST LOW 20 MIN: CPT

## 2023-03-20 RX ORDER — HYDROXYZINE HYDROCHLORIDE 10 MG/1
10 TABLET, FILM COATED ORAL 3 TIMES DAILY PRN
COMMUNITY

## 2023-03-20 RX ORDER — AMOXICILLIN 500 MG/1
500 CAPSULE ORAL 2 TIMES DAILY
Qty: 20 CAPSULE | Refills: 0 | Status: SHIPPED | OUTPATIENT
Start: 2023-03-20 | End: 2023-03-30

## 2023-03-20 RX ORDER — CITALOPRAM 20 MG/1
TABLET ORAL DAILY
COMMUNITY

## 2023-03-20 ASSESSMENT — ENCOUNTER SYMPTOMS
SORE THROAT: 1
RHINORRHEA: 0
CHEST TIGHTNESS: 0
COUGH: 0
VOMITING: 0
NAUSEA: 0
SHORTNESS OF BREATH: 0
DIARRHEA: 0

## 2023-03-20 ASSESSMENT — PAIN SCALES - GENERAL: PAINLEVEL_OUTOF10: 7

## 2023-03-20 ASSESSMENT — PAIN DESCRIPTION - LOCATION: LOCATION: THROAT

## 2023-03-20 ASSESSMENT — PAIN - FUNCTIONAL ASSESSMENT: PAIN_FUNCTIONAL_ASSESSMENT: 0-10

## 2023-03-20 NOTE — ED PROVIDER NOTES
[facility]  Urgent Care Encounter       CHIEF COMPLAINT       Chief Complaint   Patient presents with    Headache    Pharyngitis    Chills       Nurses Notes reviewed and I agree except as noted in the HPI. HISTORY OF PRESENT ILLNESS   Arcadio Siddiqi is a 25 y.o. female who presents to the [facility] urgent care for evaluation of pharyngitis. She reports her symptoms started 2 or 3 days ago. She reports that they have been progressively worsening. She reports pharyngitis, chills, headache, and myalgias. Denies nausea, vomiting, diarrhea. Denies congestion, rhinorrhea, postnasal drainage. The history is provided by the patient. No  was used. REVIEW OF SYSTEMS     Review of Systems   Constitutional:  Positive for chills. Negative for activity change, appetite change, fatigue and fever. HENT:  Positive for sore throat. Negative for ear discharge, ear pain and rhinorrhea. Respiratory:  Negative for cough, chest tightness and shortness of breath. Cardiovascular:  Negative for chest pain. Gastrointestinal:  Negative for diarrhea, nausea and vomiting. Genitourinary:  Negative for dysuria. Musculoskeletal:  Positive for myalgias. Skin:  Negative for rash. Allergic/Immunologic: Negative for environmental allergies and food allergies. Neurological:  Positive for headaches. Negative for dizziness. PAST MEDICAL HISTORY         Diagnosis Date    Anxiety     Depression 9/30/2021       SURGICALHISTORY     Patient  has a past surgical history that includes laparoscopic appendectomy (N/A, 11/22/2021).     CURRENT MEDICATIONS       Discharge Medication List as of 3/20/2023  1:55 PM        CONTINUE these medications which have NOT CHANGED    Details   citalopram (CELEXA) 20 MG tablet Take by mouth dailyHistorical Med      hydrOXYzine HCl (ATARAX) 10 MG tablet Take 10 mg by mouth 3 times daily as needed for ItchingHistorical Med      levonorgestrel-ethinyl note were completed with a voice recognition program. Efforts were made to edit the dictations but occasionally words are mis-transcribed.)           Cameron Blandon, YOSHI - CNP  03/20/23 0419

## 2023-03-20 NOTE — Clinical Note
Dylon Faith was seen and treated in our emergency department on 3/20/2023. She may return to work on 03/22/2023. May be off school or work one to two days if needed. Should also be fever free 24 hours before returning to work or school. If you have any questions or concerns, please don't hesitate to call.       Sulma Pearce, APRN - CNP

## 2023-03-21 ENCOUNTER — TELEPHONE (OUTPATIENT)
Dept: FAMILY MEDICINE CLINIC | Age: 23
End: 2023-03-21

## 2023-03-21 NOTE — LETTER
[facility]  [address] [address] [address], [address]  Phone: [phone]  Fax: [phone]    March 21, 2023    Arcadio Devang  [address] [place] [address]      Dear Tomás Vu,    This letter is regarding your Emergency Department (ED) visit at [address] [address] on 3/20/23. Dr. Marina Cordova wanted to make sure that you understand your discharge instructions and that you were able to fill any prescriptions that may have been ordered for you. Please contact the office at the above phone number if the ED advised you to follow up with Dr. Marina Cordova, or if you have any further questions or needs. Also did you know -   *Visiting the ED for a non-emergency could result in higher co-pays than you would normally be subject to paying? *You can call your doctor even after hours so they can direct you to the most appropriate care. [facility]) practices can often offer you an appointment on the same day that you call. *We have some [facility] offices that offer Walk-in appointments; check our website for availability in your community, [email or web address].      *Evisits are now available for patients for $36 through [employer] for certain conditions:  * Sinus, cold and or cough       * Diarrhea            * Headache  * Heartburn                                * Poison Emy          * Back pain     * Urinary problems                         If you do not have [employer] and are interested, please contact the office and a staff member may assist you or go to [email or web address].     Sincerely,     Marina Cordova DO and your [facility]

## 2023-06-27 ENCOUNTER — HOSPITAL ENCOUNTER (EMERGENCY)
Age: 23
Discharge: HOME | End: 2023-06-27
Payer: COMMERCIAL

## 2023-06-27 VITALS
RESPIRATION RATE: 14 BRPM | BODY MASS INDEX: 22.33 KG/M2 | DIASTOLIC BLOOD PRESSURE: 55 MMHG | WEIGHT: 134 LBS | TEMPERATURE: 97.6 F | HEART RATE: 94 BPM | SYSTOLIC BLOOD PRESSURE: 125 MMHG | OXYGEN SATURATION: 98 % | HEIGHT: 65 IN

## 2023-06-27 DIAGNOSIS — J40 BRONCHITIS: Primary | ICD-10-CM

## 2023-06-27 PROCEDURE — 99213 OFFICE O/P EST LOW 20 MIN: CPT | Performed by: NURSE PRACTITIONER

## 2023-06-27 PROCEDURE — 99213 OFFICE O/P EST LOW 20 MIN: CPT

## 2023-06-27 RX ORDER — BENZONATATE 100 MG/1
100 CAPSULE ORAL 3 TIMES DAILY PRN
Qty: 20 CAPSULE | Refills: 0 | Status: SHIPPED | OUTPATIENT
Start: 2023-06-27 | End: 2023-07-04

## 2023-06-27 RX ORDER — AZITHROMYCIN 250 MG/1
TABLET, FILM COATED ORAL
Qty: 1 PACKET | Refills: 0 | Status: SHIPPED | OUTPATIENT
Start: 2023-06-27

## 2023-06-27 RX ORDER — ONDANSETRON 4 MG/1
4 TABLET, ORALLY DISINTEGRATING ORAL EVERY 8 HOURS PRN
COMMUNITY

## 2023-06-27 RX ORDER — ALBUTEROL SULFATE 90 UG/1
2 AEROSOL, METERED RESPIRATORY (INHALATION) EVERY 6 HOURS PRN
Qty: 1 EACH | Refills: 0 | Status: SHIPPED | OUTPATIENT
Start: 2023-06-27

## 2023-06-27 RX ORDER — ACETAMINOPHEN 325 MG/1
650 TABLET ORAL EVERY 6 HOURS PRN
COMMUNITY

## 2023-06-27 ASSESSMENT — ENCOUNTER SYMPTOMS
VOMITING: 0
DIARRHEA: 0
SHORTNESS OF BREATH: 0
COUGH: 1
NAUSEA: 0
EYE DISCHARGE: 0
BACK PAIN: 1
EYE REDNESS: 0
SORE THROAT: 0
RHINORRHEA: 1
TROUBLE SWALLOWING: 0

## 2023-06-27 ASSESSMENT — PAIN DESCRIPTION - LOCATION: LOCATION: BACK

## 2023-06-27 ASSESSMENT — PAIN SCALES - GENERAL: PAINLEVEL_OUTOF10: 9

## 2023-06-27 ASSESSMENT — PAIN DESCRIPTION - ORIENTATION: ORIENTATION: LEFT;RIGHT;LOWER

## 2023-06-27 ASSESSMENT — PAIN - FUNCTIONAL ASSESSMENT: PAIN_FUNCTIONAL_ASSESSMENT: 0-10

## 2023-08-23 ENCOUNTER — OFFICE VISIT (OUTPATIENT)
Age: 23
End: 2023-08-23

## 2023-08-23 VITALS
BODY MASS INDEX: 23.32 KG/M2 | HEIGHT: 65 IN | RESPIRATION RATE: 16 BRPM | DIASTOLIC BLOOD PRESSURE: 68 MMHG | OXYGEN SATURATION: 97 % | TEMPERATURE: 98.1 F | SYSTOLIC BLOOD PRESSURE: 110 MMHG | HEART RATE: 80 BPM | WEIGHT: 140 LBS

## 2023-08-23 DIAGNOSIS — Z00.00 WELL ADULT EXAM: Primary | ICD-10-CM

## 2023-08-23 DIAGNOSIS — Z11.1 TUBERCULIN SKIN TEST ENCOUNTER: ICD-10-CM

## 2023-08-23 PROCEDURE — 99999 PR OFFICE/OUTPT VISIT,PROCEDURE ONLY: CPT | Performed by: STUDENT IN AN ORGANIZED HEALTH CARE EDUCATION/TRAINING PROGRAM

## 2023-08-23 SDOH — ECONOMIC STABILITY: INCOME INSECURITY: HOW HARD IS IT FOR YOU TO PAY FOR THE VERY BASICS LIKE FOOD, HOUSING, MEDICAL CARE, AND HEATING?: NOT HARD AT ALL

## 2023-08-23 SDOH — ECONOMIC STABILITY: HOUSING INSECURITY
IN THE LAST 12 MONTHS, WAS THERE A TIME WHEN YOU DID NOT HAVE A STEADY PLACE TO SLEEP OR SLEPT IN A SHELTER (INCLUDING NOW)?: NO

## 2023-08-23 SDOH — ECONOMIC STABILITY: FOOD INSECURITY: WITHIN THE PAST 12 MONTHS, THE FOOD YOU BOUGHT JUST DIDN'T LAST AND YOU DIDN'T HAVE MONEY TO GET MORE.: NEVER TRUE

## 2023-08-23 SDOH — ECONOMIC STABILITY: FOOD INSECURITY: WITHIN THE PAST 12 MONTHS, YOU WORRIED THAT YOUR FOOD WOULD RUN OUT BEFORE YOU GOT MONEY TO BUY MORE.: NEVER TRUE

## 2023-08-23 ASSESSMENT — ENCOUNTER SYMPTOMS
NAUSEA: 0
SHORTNESS OF BREATH: 0
VOMITING: 0
DIARRHEA: 0
CONSTIPATION: 0
COUGH: 0

## 2023-08-23 ASSESSMENT — PATIENT HEALTH QUESTIONNAIRE - PHQ9
10. IF YOU CHECKED OFF ANY PROBLEMS, HOW DIFFICULT HAVE THESE PROBLEMS MADE IT FOR YOU TO DO YOUR WORK, TAKE CARE OF THINGS AT HOME, OR GET ALONG WITH OTHER PEOPLE: 1
SUM OF ALL RESPONSES TO PHQ QUESTIONS 1-9: 4
8. MOVING OR SPEAKING SO SLOWLY THAT OTHER PEOPLE COULD HAVE NOTICED. OR THE OPPOSITE, BEING SO FIGETY OR RESTLESS THAT YOU HAVE BEEN MOVING AROUND A LOT MORE THAN USUAL: 0
7. TROUBLE CONCENTRATING ON THINGS, SUCH AS READING THE NEWSPAPER OR WATCHING TELEVISION: 0
2. FEELING DOWN, DEPRESSED OR HOPELESS: 1
1. LITTLE INTEREST OR PLEASURE IN DOING THINGS: 1
SUM OF ALL RESPONSES TO PHQ QUESTIONS 1-9: 4
SUM OF ALL RESPONSES TO PHQ9 QUESTIONS 1 & 2: 2
3. TROUBLE FALLING OR STAYING ASLEEP: 1
SUM OF ALL RESPONSES TO PHQ QUESTIONS 1-9: 4
9. THOUGHTS THAT YOU WOULD BE BETTER OFF DEAD, OR OF HURTING YOURSELF: 0
SUM OF ALL RESPONSES TO PHQ QUESTIONS 1-9: 4
4. FEELING TIRED OR HAVING LITTLE ENERGY: 1
6. FEELING BAD ABOUT YOURSELF - OR THAT YOU ARE A FAILURE OR HAVE LET YOURSELF OR YOUR FAMILY DOWN: 0
5. POOR APPETITE OR OVEREATING: 0

## 2023-08-23 NOTE — PROGRESS NOTES
[facility]  [address] [place] [address]  Dept: [phone]  Loc: [phone]    Ann Sullivan is a 26 y.o. female who presents today for:  Chief Complaint   Patient presents with    Annual Exam     Nursing Physical        Goals    None         Assessment/Plan:     Justice was seen today for annual exam.    Diagnoses and all orders for this visit:    Well adult exam        Well adult, no concerns, continue medications as prescribed. Nursing school physical form completed. No follow-ups on file. Medications Prescribed:  No orders of the defined types were placed in this encounter. No future appointments. 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 Has no other concerns at this time.      Past Medical History:   Diagnosis Date    Anxiety     Depression 9/30/2021      Past Surgical History:   Procedure Laterality Date    LAPAROSCOPIC APPENDECTOMY N/A 11/22/2021    LAPAROSCOPIC APPENDECTOMY performed by Jolie Chappell MD at [address] [place] [address] Drive History   Problem Relation Age of Onset    Migraines Mother     High Blood Pressure Maternal Grandmother     High Blood Pressure Maternal Grandfather     Asthma Neg Hx     Cancer Neg Hx     Depression Neg Hx     Diabetes Neg Hx     Heart Disease Neg Hx     Kidney Disease Neg Hx     Stroke Neg Hx      Social History     Tobacco Use    Smoking status: Never    Smokeless tobacco: Never   Substance Use Topics    Alcohol use: Not Currently      Current Outpatient Medications   Medication Sig Dispense Refill    ondansetron (ZOFRAN-ODT) 4 MG disintegrating tablet Take 1 tablet by mouth every 8 hours as needed for Nausea or Vomiting      Ibuprofen (MOTRIN PO) Take by mouth      acetaminophen (TYLENOL) 325 MG tablet Take 2 tablets by mouth every 6 hours as needed for Pain      citalopram (CELEXA) 20 MG tablet Take by mouth daily      hydrOXYzine HCl (ATARAX) 10 MG tablet

## 2023-08-25 LAB
INDURATION: NORMAL
TB SKIN TEST: NEGATIVE

## 2024-03-06 ENCOUNTER — APPOINTMENT (OUTPATIENT)
Dept: ULTRASOUND IMAGING | Age: 24
End: 2024-03-06
Payer: COMMERCIAL

## 2024-03-06 ENCOUNTER — HOSPITAL ENCOUNTER (EMERGENCY)
Age: 24
Discharge: HOME | End: 2024-03-06
Attending: FAMILY MEDICINE
Payer: COMMERCIAL

## 2024-03-06 VITALS
HEIGHT: 65 IN | BODY MASS INDEX: 23.32 KG/M2 | OXYGEN SATURATION: 98 % | HEART RATE: 82 BPM | TEMPERATURE: 98.2 F | RESPIRATION RATE: 17 BRPM | SYSTOLIC BLOOD PRESSURE: 119 MMHG | WEIGHT: 140 LBS | DIASTOLIC BLOOD PRESSURE: 69 MMHG

## 2024-03-06 DIAGNOSIS — N83.201 CYST OF RIGHT OVARY: Primary | ICD-10-CM

## 2024-03-06 DIAGNOSIS — R10.31 ABDOMINAL PAIN, RIGHT LOWER QUADRANT: ICD-10-CM

## 2024-03-06 DIAGNOSIS — R10.31 RIGHT LOWER QUADRANT ABDOMINAL PAIN: ICD-10-CM

## 2024-03-06 LAB
ANION GAP SERPL CALC-SCNC: 12 MEQ/L (ref 8–16)
B-HCG SERPL QL: NEGATIVE
BASOPHILS ABSOLUTE: 0.1 THOU/MM3 (ref 0–0.1)
BASOPHILS NFR BLD AUTO: 1 %
BILIRUB UR QL STRIP.AUTO: NEGATIVE
BUN SERPL-MCNC: 10 MG/DL (ref 7–22)
CALCIUM SERPL-MCNC: 9.5 MG/DL (ref 8.5–10.5)
CHARACTER UR: CLEAR
CHLORIDE SERPL-SCNC: 102 MEQ/L (ref 98–111)
CO2 SERPL-SCNC: 23 MEQ/L (ref 23–33)
COLOR: YELLOW
CREAT SERPL-MCNC: 0.7 MG/DL (ref 0.4–1.2)
DEPRECATED RDW RBC AUTO: 41.3 FL (ref 35–45)
EOSINOPHIL NFR BLD AUTO: 2.9 %
EOSINOPHILS ABSOLUTE: 0.2 THOU/MM3 (ref 0–0.4)
ERYTHROCYTE [DISTWIDTH] IN BLOOD BY AUTOMATED COUNT: 12.6 % (ref 11.5–14.5)
GFR SERPL CREATININE-BSD FRML MDRD: > 60 ML/MIN/1.73M2
GLUCOSE SERPL-MCNC: 96 MG/DL (ref 70–108)
GLUCOSE UR QL STRIP.AUTO: NEGATIVE MG/DL
HCT VFR BLD AUTO: 43.6 % (ref 37–47)
HGB BLD-MCNC: 14.5 GM/DL (ref 12–16)
HGB UR QL STRIP.AUTO: NEGATIVE
IMM GRANULOCYTES # BLD AUTO: 0.01 THOU/MM3 (ref 0–0.07)
IMM GRANULOCYTES NFR BLD AUTO: 0.1 %
KETONES UR QL STRIP.AUTO: ABNORMAL
LYMPHOCYTES ABSOLUTE: 2.6 THOU/MM3 (ref 1–4.8)
LYMPHOCYTES NFR BLD AUTO: 35.3 %
MCH RBC QN AUTO: 29.8 PG (ref 26–33)
MCHC RBC AUTO-ENTMCNC: 33.3 GM/DL (ref 32.2–35.5)
MCV RBC AUTO: 89.7 FL (ref 81–99)
MONOCYTES ABSOLUTE: 0.6 THOU/MM3 (ref 0.4–1.3)
MONOCYTES NFR BLD AUTO: 7.7 %
NEUTROPHILS NFR BLD AUTO: 53 %
NITRITE UR QL STRIP: NEGATIVE
NRBC BLD AUTO-RTO: 0 /100 WBC
OSMOLALITY SERPL CALC.SUM OF ELEC: 272.7 MOSMOL/KG (ref 275–300)
PH UR STRIP.AUTO: 6 [PH] (ref 5–9)
PLATELET # BLD AUTO: 421 THOU/MM3 (ref 130–400)
PMV BLD AUTO: 9.3 FL (ref 9.4–12.4)
POTASSIUM SERPL-SCNC: 4 MEQ/L (ref 3.5–5.2)
PROT UR STRIP.AUTO-MCNC: NEGATIVE MG/DL
RBC # BLD AUTO: 4.86 MILL/MM3 (ref 4.2–5.4)
SEGMENTED NEUTROPHILS ABSOLUTE COUNT: 3.9 THOU/MM3 (ref 1.8–7.7)
SODIUM SERPL-SCNC: 137 MEQ/L (ref 135–145)
SP GR UR REFRACT.AUTO: 1.02 (ref 1–1.03)
UROBILINOGEN, URINE: 0.2 EU/DL (ref 0–1)
WBC # BLD AUTO: 7.3 THOU/MM3 (ref 4.8–10.8)
WBC #/AREA URNS HPF: NEGATIVE /[HPF]

## 2024-03-06 PROCEDURE — 99214 OFFICE O/P EST MOD 30 MIN: CPT | Performed by: NURSE PRACTITIONER

## 2024-03-06 PROCEDURE — 99284 EMERGENCY DEPT VISIT MOD MDM: CPT

## 2024-03-06 PROCEDURE — 96374 THER/PROPH/DIAG INJ IV PUSH: CPT

## 2024-03-06 PROCEDURE — 80048 BASIC METABOLIC PNL TOTAL CA: CPT

## 2024-03-06 PROCEDURE — 85025 COMPLETE CBC W/AUTO DIFF WBC: CPT

## 2024-03-06 PROCEDURE — 99215 OFFICE O/P EST HI 40 MIN: CPT

## 2024-03-06 PROCEDURE — 6360000002 HC RX W HCPCS: Performed by: FAMILY MEDICINE

## 2024-03-06 PROCEDURE — 81003 URINALYSIS AUTO W/O SCOPE: CPT

## 2024-03-06 PROCEDURE — 36415 COLL VENOUS BLD VENIPUNCTURE: CPT

## 2024-03-06 PROCEDURE — 84703 CHORIONIC GONADOTROPIN ASSAY: CPT

## 2024-03-06 PROCEDURE — 76830 TRANSVAGINAL US NON-OB: CPT

## 2024-03-06 RX ORDER — KETOROLAC TROMETHAMINE 30 MG/ML
30 INJECTION, SOLUTION INTRAMUSCULAR; INTRAVENOUS ONCE
Status: COMPLETED | OUTPATIENT
Start: 2024-03-06 | End: 2024-03-06

## 2024-03-06 RX ADMIN — KETOROLAC TROMETHAMINE 30 MG: 30 INJECTION INTRAMUSCULAR; INTRAVENOUS at 18:14

## 2024-03-06 ASSESSMENT — PAIN - FUNCTIONAL ASSESSMENT
PAIN_FUNCTIONAL_ASSESSMENT: 0-10
PAIN_FUNCTIONAL_ASSESSMENT: 0-10

## 2024-03-06 ASSESSMENT — ENCOUNTER SYMPTOMS
DIARRHEA: 0
SHORTNESS OF BREATH: 0
NAUSEA: 0
VOMITING: 0
COUGH: 0
ABDOMINAL PAIN: 1

## 2024-03-06 ASSESSMENT — PAIN DESCRIPTION - PAIN TYPE: TYPE: ACUTE PAIN

## 2024-03-06 ASSESSMENT — PAIN SCALES - GENERAL
PAINLEVEL_OUTOF10: 4
PAINLEVEL_OUTOF10: 5

## 2024-03-06 ASSESSMENT — PAIN DESCRIPTION - DESCRIPTORS: DESCRIPTORS: SHARP;CRAMPING

## 2024-03-06 ASSESSMENT — PAIN DESCRIPTION - ORIENTATION
ORIENTATION: RIGHT;LOWER
ORIENTATION: RIGHT;LOWER

## 2024-03-06 ASSESSMENT — PAIN DESCRIPTION - LOCATION
LOCATION: ABDOMEN
LOCATION: ABDOMEN

## 2024-03-06 ASSESSMENT — PAIN DESCRIPTION - FREQUENCY: FREQUENCY: INTERMITTENT

## 2024-03-06 ASSESSMENT — PAIN DESCRIPTION - ONSET: ONSET: GRADUAL

## 2024-03-06 NOTE — ED TRIAGE NOTES
Arrives to [facility] for the evaluation of RLQ abdominal/suprapubic pain that started 2 days ago.  At this time pain rated 4/10 in severity and described as sharp, cramping pain.  States just getting off her period 3 days ago and thought initially pain was related.  Reports having a soft BM on Mon 3/4 and then a harder stool on 3/5.  Held off being seen thinking maybe pain related to constipation.  Denies hematuria, diarrhea, nausea, vomiting, urinary urgency or frequency.  Afebrile.  Waiting provider to assess.

## 2024-03-06 NOTE — ED PROVIDER NOTES
[facility] URGENT CARE  UrgentCare Encounter      CHIEFCOMPLAINT       Chief Complaint   Patient presents with    Abdominal Pain     Suprapubic- Right- \"Sharp, Cramping pain\"   LMP 3/3/24 (Last day)  Hx: Appendectomy        Nurses Notes reviewed and I agree except as noted in the HPI.  HISTORY OF PRESENT ILLNESS   Xenia Engel is a 23 y.o. female who presents to the urgent care for evaluation.     She presents for evaluation of right sided abdominal pain that started 3 days ago.  Pain seems to come and go and is sharp in nature when it is present. Sometimes cramping. She is concerned she may have another ovarian cyst.  Previous hysterectomy.  No urinary symptoms or history of kidney stones.    The patient/patient representative has no other acute complaints at this time.    REVIEW OF SYSTEMS     Review of Systems   Constitutional:  Negative for chills, fatigue and fever.   Respiratory:  Negative for cough and shortness of breath.    Cardiovascular:  Negative for chest pain.   Gastrointestinal:  Positive for abdominal pain. Negative for diarrhea, nausea and vomiting.   Skin:  Negative for rash.   Allergic/Immunologic: Negative for environmental allergies and food allergies.   Neurological:  Negative for headaches.   Hematological:  Negative for adenopathy.       PAST MEDICAL HISTORY         Diagnosis Date    Anxiety     Depression 9/30/2021       SURGICAL HISTORY     Patient  has a past surgical history that includes laparoscopic appendectomy (N/A, 11/22/2021).    CURRENT MEDICATIONS       Previous Medications    ACETAMINOPHEN (TYLENOL) 325 MG TABLET    Take 2 tablets by mouth every 6 hours as needed for Pain    CITALOPRAM (CELEXA) 20 MG TABLET    Take by mouth daily    HYDROXYZINE HCL (ATARAX) 10 MG TABLET    Take 1 tablet by mouth 3 times daily as needed for Itching    IBUPROFEN (MOTRIN PO)    Take by mouth    LEVONORGESTREL-ETHINYL ESTRADIOL (NORDETTE) 0.15-30 MG-MCG PER TABLET    Take 1 tablet by

## 2024-03-06 NOTE — DISCHARGE INSTRUCTIONS
YOUR RIGHT LOWER ABDOMINAL PAIN WAS DUE TO AN OVARIAN CYST, WHICH SHOULD IMPROVE IN A FEW DAYS' TIME.    TAKE DICLOFENAC ( PRESCRIPTION STRENGTH ANTI-INFLAMMATORY MEDICATION) TWICE DAILY AS NEEDED FOR PAIN CONTROL.

## 2024-03-06 NOTE — ED PROVIDER NOTES
EMERGENCY DEPARTMENT ENCOUNTER     CHIEF COMPLAINT   Chief Complaint   Patient presents with    Abdominal Pain     Suprapubic- Right- \"Sharp, Cramping pain\"   LMP 3/3/24 (Last day)  Hx: Appendectomy         HPI   Xenia Engel is a 23 y.o. female who presents with right lower abdominal pain, onset was earlier today. The duration has been intermittent since the onset, not associated with any extreme exercise or trauma.  The patient has associated pain radiating down her right leg at times. She denies vaginal bleeding, but her menstrual cycle just ended a few days ago.  There are no alleviating factors.  The context is that the symptoms started spontaneously, without any known precipitants.    Pt previously had her appendix removed.    PAST MEDICAL & SURGICAL HISTORY   Past Medical History:   Diagnosis Date    Anxiety     Depression 9/30/2021     Past Surgical History:   Procedure Laterality Date    LAPAROSCOPIC APPENDECTOMY N/A 11/22/2021    LAPAROSCOPIC APPENDECTOMY performed by Erinn Nascimento MD at [facility] OR        CURRENT MEDICATIONS   Current Outpatient Rx   Medication Sig Dispense Refill    diclofenac (VOLTAREN) 50 MG EC tablet Take 1 tablet by mouth 2 times daily as needed for Pain 20 tablet 0    ondansetron (ZOFRAN-ODT) 4 MG disintegrating tablet Take 1 tablet by mouth every 8 hours as needed for Nausea or Vomiting      Ibuprofen (MOTRIN PO) Take by mouth      acetaminophen (TYLENOL) 325 MG tablet Take 2 tablets by mouth every 6 hours as needed for Pain      citalopram (CELEXA) 20 MG tablet Take by mouth daily      hydrOXYzine HCl (ATARAX) 10 MG tablet Take 1 tablet by mouth 3 times daily as needed for Itching      levonorgestrel-ethinyl estradiol (NORDETTE) 0.15-30 MG-MCG per tablet Take 1 tablet by mouth daily          ALLERGIES   No Known Allergies     SOCIAL AND FAMILY HISTORY   Social History     Socioeconomic History    Marital status: Single     Spouse name: None    Number of children: None    Years of

## 2024-03-06 NOTE — ED TRIAGE NOTES
Presents to ED with c/o RLQ abdominal pain that started Monday morning. Patient transferred from urgent care. Alert and oriented. Respirations easy and unlabored.

## 2024-03-14 ENCOUNTER — NURSE ONLY (OUTPATIENT)
Dept: LAB | Age: 24
End: 2024-03-14

## 2024-03-14 LAB
25(OH)D3 SERPL-MCNC: 23 NG/ML (ref 30–100)
IRON SATN MFR SERPL: 61 % (ref 20–50)
IRON SERPL-MCNC: 180 UG/DL (ref 50–170)
TIBC SERPL-MCNC: 293 UG/DL (ref 171–450)
TSH SERPL DL<=0.005 MIU/L-ACNC: 0.76 UIU/ML (ref 0.4–4.2)
VIT B12 SERPL-MCNC: 440 PG/ML (ref 211–911)

## 2024-10-11 ENCOUNTER — HOSPITAL ENCOUNTER (EMERGENCY)
Age: 24
Discharge: HOME | End: 2024-10-11
Attending: EMERGENCY MEDICINE
Payer: COMMERCIAL

## 2024-10-11 ENCOUNTER — APPOINTMENT (OUTPATIENT)
Dept: CT IMAGING | Age: 24
End: 2024-10-11
Payer: COMMERCIAL

## 2024-10-11 VITALS
DIASTOLIC BLOOD PRESSURE: 61 MMHG | SYSTOLIC BLOOD PRESSURE: 110 MMHG | BODY MASS INDEX: 24.49 KG/M2 | HEIGHT: 65 IN | RESPIRATION RATE: 17 BRPM | WEIGHT: 147 LBS | TEMPERATURE: 97.8 F | HEART RATE: 74 BPM | OXYGEN SATURATION: 100 %

## 2024-10-11 DIAGNOSIS — S39.012A LUMBOSACRAL STRAIN, INITIAL ENCOUNTER: Primary | ICD-10-CM

## 2024-10-11 LAB
ANION GAP SERPL CALC-SCNC: 12 MEQ/L (ref 8–16)
BASOPHILS ABSOLUTE: 0.1 THOU/MM3 (ref 0–0.1)
BASOPHILS NFR BLD AUTO: 1.1 %
BILIRUB UR QL STRIP.AUTO: NEGATIVE
BUN SERPL-MCNC: 7 MG/DL (ref 7–22)
CALCIUM SERPL-MCNC: 8.8 MG/DL (ref 8.5–10.5)
CHARACTER UR: CLEAR
CHLORIDE SERPL-SCNC: 101 MEQ/L (ref 98–111)
CO2 SERPL-SCNC: 26 MEQ/L (ref 23–33)
COLOR, UA: YELLOW
CREAT SERPL-MCNC: 0.7 MG/DL (ref 0.4–1.2)
DEPRECATED RDW RBC AUTO: 41.1 FL (ref 35–45)
EOSINOPHIL NFR BLD AUTO: 3 %
EOSINOPHILS ABSOLUTE: 0.2 THOU/MM3 (ref 0–0.4)
ERYTHROCYTE [DISTWIDTH] IN BLOOD BY AUTOMATED COUNT: 12.6 % (ref 11.5–14.5)
GFR SERPL CREATININE-BSD FRML MDRD: > 90 ML/MIN/1.73M2
GLUCOSE SERPL-MCNC: 88 MG/DL (ref 70–108)
GLUCOSE UR QL STRIP.AUTO: NEGATIVE MG/DL
HCG UR QL: NEGATIVE
HCT VFR BLD AUTO: 37.6 % (ref 37–47)
HGB BLD-MCNC: 12.3 GM/DL (ref 12–16)
HGB UR QL STRIP.AUTO: NEGATIVE
IMM GRANULOCYTES # BLD AUTO: 0.02 THOU/MM3 (ref 0–0.07)
IMM GRANULOCYTES NFR BLD AUTO: 0.3 %
KETONES UR QL STRIP.AUTO: NEGATIVE
LYMPHOCYTES ABSOLUTE: 2.7 THOU/MM3 (ref 1–4.8)
LYMPHOCYTES NFR BLD AUTO: 41.2 %
MCH RBC QN AUTO: 29.2 PG (ref 26–33)
MCHC RBC AUTO-ENTMCNC: 32.7 GM/DL (ref 32.2–35.5)
MCV RBC AUTO: 89.3 FL (ref 81–99)
MONOCYTES ABSOLUTE: 0.7 THOU/MM3 (ref 0.4–1.3)
MONOCYTES NFR BLD AUTO: 10.5 %
NEUTROPHILS ABSOLUTE: 2.9 THOU/MM3 (ref 1.8–7.7)
NEUTROPHILS NFR BLD AUTO: 43.9 %
NITRITE UR QL STRIP: NEGATIVE
NRBC BLD AUTO-RTO: 0 /100 WBC
OSMOLALITY SERPL CALC.SUM OF ELEC: 274.9 MOSMOL/KG (ref 275–300)
PH UR STRIP.AUTO: 6 [PH] (ref 5–9)
PLATELET # BLD AUTO: 347 THOU/MM3 (ref 130–400)
PMV BLD AUTO: 9.4 FL (ref 9.4–12.4)
POTASSIUM SERPL-SCNC: 3.5 MEQ/L (ref 3.5–5.2)
PROT UR STRIP.AUTO-MCNC: NEGATIVE MG/DL
RBC # BLD AUTO: 4.21 MILL/MM3 (ref 4.2–5.4)
SODIUM SERPL-SCNC: 139 MEQ/L (ref 135–145)
SP GR UR REFRACT.AUTO: 1.02 (ref 1–1.03)
UROBILINOGEN, URINE: 0.2 EU/DL (ref 0–1)
WBC # BLD AUTO: 6.6 THOU/MM3 (ref 4.8–10.8)
WBC #/AREA URNS HPF: NEGATIVE /[HPF]

## 2024-10-11 PROCEDURE — 80048 BASIC METABOLIC PNL TOTAL CA: CPT

## 2024-10-11 PROCEDURE — 74176 CT ABD & PELVIS W/O CONTRAST: CPT

## 2024-10-11 PROCEDURE — 85025 COMPLETE CBC W/AUTO DIFF WBC: CPT

## 2024-10-11 PROCEDURE — 99284 EMERGENCY DEPT VISIT MOD MDM: CPT

## 2024-10-11 PROCEDURE — 36415 COLL VENOUS BLD VENIPUNCTURE: CPT

## 2024-10-11 PROCEDURE — 6360000002 HC RX W HCPCS

## 2024-10-11 PROCEDURE — 96372 THER/PROPH/DIAG INJ SC/IM: CPT

## 2024-10-11 PROCEDURE — 81003 URINALYSIS AUTO W/O SCOPE: CPT

## 2024-10-11 PROCEDURE — 81025 URINE PREGNANCY TEST: CPT

## 2024-10-11 RX ORDER — KETOROLAC TROMETHAMINE 30 MG/ML
15 INJECTION, SOLUTION INTRAMUSCULAR; INTRAVENOUS ONCE
Status: COMPLETED | OUTPATIENT
Start: 2024-10-11 | End: 2024-10-11

## 2024-10-11 RX ORDER — LIDOCAINE 4 G/G
1 PATCH TOPICAL DAILY
Qty: 30 PATCH | Refills: 0 | Status: SHIPPED | OUTPATIENT
Start: 2024-10-11 | End: 2024-11-10

## 2024-10-11 RX ORDER — KETOROLAC TROMETHAMINE 30 MG/ML
15 INJECTION, SOLUTION INTRAMUSCULAR; INTRAVENOUS ONCE
Status: DISCONTINUED | OUTPATIENT
Start: 2024-10-11 | End: 2024-10-11

## 2024-10-11 RX ADMIN — KETOROLAC TROMETHAMINE 15 MG: 30 INJECTION, SOLUTION INTRAMUSCULAR at 08:03

## 2024-10-11 ASSESSMENT — PAIN SCALES - GENERAL
PAINLEVEL_OUTOF10: 7
PAINLEVEL_OUTOF10: 7

## 2024-10-11 ASSESSMENT — PAIN - FUNCTIONAL ASSESSMENT
PAIN_FUNCTIONAL_ASSESSMENT: 0-10
PAIN_FUNCTIONAL_ASSESSMENT: 0-10

## 2024-10-11 NOTE — ED PROVIDER NOTES
[facility] EMERGENCY DEPT  EMERGENCY DEPARTMENT ENCOUNTER          Pt Name: Xenia Engel  MRN: [id number]  Birthdate 2000  Date of evaluation: 10/11/2024  Physician: Joni Ruiz MD  Supervising Attending Physician: Frank Cortez DO       CHIEF COMPLAINT     No chief complaint on file.        HISTORY OF PRESENT ILLNESS    HPI  Xenia Engel is a 24 y.o. female who presents to the emergency department from home, by private vehicle for evaluation of back pain    Patient with a history of right-sided cyst on the ovary presents to the ED complaining of right lower back pain since Sunday on and off worsened by movement sometimes on the left side as well associated with frequent urination.  She denies any concerns for STDs, she denied any hematuria dysuria.  Last menstrual period started on September 18.  Was seen by her OB on Monday and was told that her cyst is only slightly bigger no intervention planned at the moment.  The patient has no other acute complaints at this time.      REVIEW OF SYSTEMS   Review of Systems      PAST MEDICAL AND SURGICAL HISTORY     Past Medical History:   Diagnosis Date    Anxiety     Depression 9/30/2021     Past Surgical History:   Procedure Laterality Date    LAPAROSCOPIC APPENDECTOMY N/A 11/22/2021    LAPAROSCOPIC APPENDECTOMY performed by Erinn Nascimento MD at [facility] OR         MEDICATIONS   No current facility-administered medications for this encounter.    Current Outpatient Medications:     diclofenac (VOLTAREN) 50 MG EC tablet, Take 1 tablet by mouth 2 times daily as needed for Pain, Disp: 20 tablet, Rfl: 0    ondansetron (ZOFRAN-ODT) 4 MG disintegrating tablet, Take 1 tablet by mouth every 8 hours as needed for Nausea or Vomiting, Disp: , Rfl:     Ibuprofen (MOTRIN PO), Take by mouth, Disp: , Rfl:     acetaminophen (TYLENOL) 325 MG tablet, Take 2 tablets by mouth every 6 hours as needed for Pain, Disp: , Rfl:     citalopram (CELEXA) 20 MG tablet, Take by  visit)      Radiologic studies results available at the moment of this note (None if blank):  No orders to display     See ED course below for my interpretation if applicable.  All radiology images independently reviewed by me in the clinical context of this patient, in addition to interpretation provided by the radiologist.      EKG interpretation (none if blank):  Not applicable  All EKG results are individually reviewed and interpreted by me in the clinical context of this patient.  All EKGs are also interpreted by our Cardiology department, final interpretation may not be available as of the writing of this note.      PREVIOUS RECORDS  AND EXTERNAL INFORMATION REVIEWED   History obtained from: the patient.    Pertinent previous and/or external records reviewed: Noncontributory.    Case discussed with specialties other than Emergency Medicine: Not Applicable      MEDICAL DECISION MAKING   Initial Assessment Summary:   24-year-old female presents to the ED complaining of low back pain mostly on the right side with slight tenderness.  Also complained of increased urine frequency.  Physical exam otherwise unremarkable stable vital signs  Please see ED course and MDM sections below for continuation and resolution of this initial assessment if applicable.    Initial plan:   Urinalysis urine pregnancy       Comorbid conditions pertinent to this ED encounter:  Not applicable      Differential Diagnosis includes but is not limited to:  UTI  Lumbosacral strain  Kidney stone less likely       Decision Rules/Clinical Scores utilized:  Not Applicable       Code Status:  Not addressed during this ED visit    Social determinants of health impacting treatment or disposition:  Considered and not applicable     MIPS documentation:  N/A    Medical Decision Making    24-year-old female presents to the ED complaining of low back pain mostly on the right side with slight tenderness.  Also complained of increased urine frequency.

## 2024-10-11 NOTE — ED NOTES
Pt back from CT. Pt medicated per MAR. No needs expressed at this time. Respirations even and unlabored, call light within reach. VSS

## 2024-10-11 NOTE — ED TRIAGE NOTES
Pt present to ED from home with c/c of lower back pain and urine frequency. Pt states pain began on Sunday in the right lower back and yesterday it moved to the left. Pt rates pain a 7, has not taken any medication for pain. Pt has ovarian cyst but saw an OB on Monday and states she does not think that is the problem. Pt states last period was September 18. Rr easy and unlabored. No distress noted.

## 2024-10-11 NOTE — DISCHARGE INSTRUCTIONS
Take tylenol or motrin as needed for pain, always take motrin with a meal and plenty of fluids. Avoid taking for longer than 5 days.    Apply lidocaine patches to affected areas.    Avoid heavy lifting and strenuous physical activity for the next few days.    Return to the emergency department immediately if there is any new or concerning symptom.

## 2024-11-05 ENCOUNTER — HOSPITAL ENCOUNTER (OUTPATIENT)
Age: 24
Discharge: HOME | End: 2024-11-05

## (undated) DEVICE — GLOVE ORANGE PI 7   MSG9070

## (undated) DEVICE — TTL1LYR 16FR10ML 100%SIL TMPST TR: Brand: MEDLINE

## (undated) DEVICE — INTENDED FOR TISSUE SEPARATION, AND OTHER PROCEDURES THAT REQUIRE A SHARP SURGICAL BLADE TO PUNCTURE OR CUT.: Brand: BARD-PARKER ® CARBON RIB-BACK BLADES

## (undated) DEVICE — SOLUTION ANTIFOG VIS SYS CLEARIFY LAPSCP

## (undated) DEVICE — TROCAR: Brand: KII FIOS FIRST ENTRY

## (undated) DEVICE — SEALER LAP L37CM MARYLAND JAW OPN NANO COAT MULTIFUNCTIONAL

## (undated) DEVICE — Z DUPLICATE USE 2431315 SET INSUF TBNG HI FLO W/ SMK EVAC FOR PNEUMOCLEAR

## (undated) DEVICE — GOWN,SIRUS,NON REINFRCD,LARGE,SET IN SL: Brand: MEDLINE

## (undated) DEVICE — BAG SPEC REM 224ML W4XL6IN DIA10MM 1 HND GYN DISP ENDOPCH

## (undated) DEVICE — GENERAL LAPAROSCOPY PACK-LF: Brand: MEDLINE INDUSTRIES, INC.

## (undated) DEVICE — STAPLER ENDOSCP 45MM L34CM STD NAT ARTC LIN CUT ECHELON FLX

## (undated) DEVICE — SUTURE VCRL + SZ 0 L27IN ABSRB VLT L26MM UR-6 5/8 CIR VCP603H

## (undated) DEVICE — ADHESIVE SKIN CLSR 0.7ML TOP DERMBND ADV

## (undated) DEVICE — RELOAD STPL L45MM H1-2.6MM MESENTERY THN TISS WHT GRIPPING

## (undated) DEVICE — INSUFFLATION NEEDLE TO ESTABLISH PNEUMOPERITONEUM.: Brand: INSUFFLATION NEEDLE

## (undated) DEVICE — SUTURE MCRYL SZ 4-0 L27IN ABSRB UD L19MM PS-2 1/2 CIR PRIM Y426H